# Patient Record
Sex: FEMALE | Race: WHITE | NOT HISPANIC OR LATINO | Employment: FULL TIME | URBAN - METROPOLITAN AREA
[De-identification: names, ages, dates, MRNs, and addresses within clinical notes are randomized per-mention and may not be internally consistent; named-entity substitution may affect disease eponyms.]

---

## 2017-02-13 RX ORDER — PHENTERMINE HYDROCHLORIDE 37.5 MG/1
37.5 TABLET ORAL
Qty: 30 TAB | Refills: 0 | Status: SHIPPED
Start: 2017-02-13 | End: 2017-08-17

## 2017-02-27 ENCOUNTER — TELEPHONE (OUTPATIENT)
Dept: MEDICAL GROUP | Facility: PHYSICIAN GROUP | Age: 27
End: 2017-02-27

## 2017-02-28 NOTE — TELEPHONE ENCOUNTER
I will write the letter for her, but I would like to see her because I haven't seen her since November.  Thank you!

## 2017-02-28 NOTE — TELEPHONE ENCOUNTER
1. Caller Name: Cata Becerra                                           Call Back Number: [phone] (home)         Patient approves a detailed voicemail message: N\A    Pt is a student at [employer] and is trying to get a letter of verification of disability through their disability resource center stating she has been diagnosed with depression and anxiety.  She has extreme test anxiety.  She was previously diagnosed by Reggie Hogan at [facility] but since she has not seen him in past yr he refuses to write the letter or release her records.  She is asking if you would be willing to write the letter.  She will come in for appointment if necessary.  Thank you

## 2017-03-01 ENCOUNTER — OFFICE VISIT (OUTPATIENT)
Dept: MEDICAL GROUP | Facility: PHYSICIAN GROUP | Age: 27
End: 2017-03-01
Payer: COMMERCIAL

## 2017-03-01 VITALS
HEART RATE: 91 BPM | TEMPERATURE: 97 F | WEIGHT: 155 LBS | HEIGHT: 62 IN | BODY MASS INDEX: 28.52 KG/M2 | DIASTOLIC BLOOD PRESSURE: 68 MMHG | SYSTOLIC BLOOD PRESSURE: 120 MMHG | RESPIRATION RATE: 16 BRPM | OXYGEN SATURATION: 96 %

## 2017-03-01 DIAGNOSIS — F41.9 ANXIETY: ICD-10-CM

## 2017-03-01 PROCEDURE — 99214 OFFICE O/P EST MOD 30 MIN: CPT | Performed by: NURSE PRACTITIONER

## 2017-03-01 NOTE — Clinical Note
March 1, 2017        To whom it may concern,      Cata Becerra (1990) was seen in my office and is under my care.  She was previously diagnosed with depression and anxiety, and she experiences significant anxiety while taking tests. Please allow her any and all accommodations that she qualifies for.        Thank you,          Yuniel Romero

## 2017-03-01 NOTE — MR AVS SNAPSHOT
"Cata Becerra   3/1/2017 4:20 PM   Office Visit   MRN: [id number]    Department:  [facility]   Dept Phone:  [phone]    Description:  Female : 1990   Provider:  GERSON Hodgson           Reason for Visit     Anxiety           Allergies as of 3/1/2017     No Known Allergies      You were diagnosed with     Anxiety   [240476]         Vital Signs     Blood Pressure Pulse Temperature Respirations Height Weight    120/68 mmHg 91 36.1 °C (97 °F) 16 1.575 m (5' 2.01\") 70.308 kg (155 lb)    Body Mass Index Oxygen Saturation Smoking Status             28.34 kg/m2 96% Never Smoker          Basic Information     Date Of Birth Sex Race Ethnicity Preferred Language    1990 Female White Non- English      Problem List              ICD-10-CM Priority Class Noted - Resolved    Headache R51   10/24/2016 - Present    Pelvic pain R10.2   10/24/2016 - Present    Anxiety F41.9   10/24/2016 - Present    Abdominal pain R10.9   2016 - Present      Health Maintenance        Date Due Completion Dates    IMM HPV VACCINE (1 of 3 - Female 3 Dose Series) 2002 ---    IMM VARICELLA (CHICKENPOX) VACCINE (1 of 2 - 2 Dose Adolescent Series) 2004 ---    IMM HEP A VACCINE (2 of 2 - Standard Series) 2010    PAP SMEAR 2012 ---    IMM DTaP/Tdap/Td Vaccine (6 - Td) 2012, 1997, 1995, 1994, 1994, 1991            Current Immunizations     Dtap Vaccine 1997, 1995, 1994, 1994, 1991    Hepatitis A Vaccine, Adult 2010    Hepatitis B Vaccine Non-Recombivax (Ped/Adol) 2002, 1997, 1996    Influenza Vaccine Quad Inj (Pf) 10/11/2016    MMR Vaccine 2002, 1995    Tdap Vaccine 2002      Below and/or attached are the medications your provider expects you to take. Review all of your home medications and newly ordered medications with your provider and/or pharmacist. Follow medication " instructions as directed by your provider and/or pharmacist. Please keep your medication list with you and share with your provider. Update the information when medications are discontinued, doses are changed, or new medications (including over-the-counter products) are added; and carry medication information at all times in the event of emergency situations     Allergies:  No Known Allergies          Medications  Valid as of: March 01, 2017 -  5:55 PM    Generic Name Brand Name Tablet Size Instructions for use    Omeprazole Magnesium (Tablet Delayed Response) PRILOSEC OTC 20 MG Take 1 Tab by mouth every day.        Phentermine HCl (Tab) ADIPEX-P 37.5 MG Take 1 Tab by mouth every morning before breakfast.        .                 Medicines prescribed today were sent to:     [id number] - [place], [place] - [id number] [facility] [address]    Phone: [phone] Fax: [phone]    Open 24 Hours?: No    [facility] [address] [facility], [place] - [address] [facility]    [address] [facility] [address]    Phone: [phone] Fax: [phone]    Open 24 Hours?: No      Medication refill instructions:       If your prescription bottle indicates you have medication refills left, it is not necessary to call your provider’s office. Please contact your pharmacy and they will refill your medication.    If your prescription bottle indicates you do not have any refills left, you may request refills at any time through one of the following ways: The online [employer] system (except Urgent Care), by calling your provider’s office, or by asking your pharmacy to contact your provider’s office with a refill request. Medication refills are processed only during regular business hours and may not be available until the next business day. Your provider may request additional information or to have a follow-up visit with you prior to refilling your medication.   *Please Note: Medication refills are  assigned a new Rx number when refilled electronically. Your pharmacy may indicate that no refills were authorized even though a new prescription for the same medication is available at the pharmacy. Please request the medicine by name with the pharmacy before contacting your provider for a refill.           MyChart Access Code: Activation code not generated  Current [employer] Status: Active

## 2017-03-02 NOTE — ASSESSMENT & PLAN NOTE
Mood has been relatively ok, but has considered the idea of starting zoloft.  Is not ready yet.  Encouraged her to consider her options.    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 Discussed plan.

## 2017-03-02 NOTE — PROGRESS NOTES
Chief Complaint   Patient presents with   • Anxiety       HISTORY OF PRESENT ILLNESS: Patient is a 26 y.o. female established patient who presents today to discuss the following issues:    Anxiety  Mood has been relatively ok, but has considered the idea of starting zoloft.  Is not ready yet.  Encouraged her to consider her options.    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 Discussed plan.            Patient Active Problem List    Diagnosis Date Noted   • Abdominal pain 11/28/2016   • Headache 10/24/2016   • Pelvic pain 10/24/2016   • Anxiety 10/24/2016       Allergies:Review of patient's allergies indicates no known allergies.    Current Outpatient Prescriptions   Medication Sig Dispense Refill   • phentermine (ADIPEX-P) 37.5 MG tablet Take 1 Tab by mouth every morning before breakfast. 30 Tab 0   • omeprazole (PRILOSEC OTC) 20 MG tablet Take 1 Tab by mouth every day. 30 Tab 5     No current facility-administered medications for this visit.       Social History   Substance Use Topics   • Smoking status: Never Smoker    • Smokeless tobacco: Never Used   • Alcohol Use: 0.0 oz/week     0 Standard drinks or equivalent per week      Comment: socially       No family status information on file.     Family History   Problem Relation Age of Onset   • Hypertension Father    • Diabetes Paternal Grandmother    • Diabetes Maternal Grandmother        Review of Systems:   Constitutional: Negative for fever, chills, weight loss and malaise/fatigue.   HENT: Negative for ear pain, nosebleeds, congestion, sore throat and neck pain.    Eyes: Negative for blurred vision.   Respiratory: Negative for cough, sputum production, shortness of breath and wheezing.    Cardiovascular: Negative for chest pain, palpitations, orthopnea and leg swelling.   Gastrointestinal: Negative for heartburn, nausea, vomiting and abdominal pain.  "  Genitourinary: Negative for dysuria, urgency and frequency.   Musculoskeletal: Negative for myalgias, joint pain, and back pain.  Skin: Negative for rash and itching.   Neurological: Negative for dizziness, tingling, tremors, sensory change, focal weakness and headaches.   Endo/Heme/Allergies: Does not bruise/bleed easily.   Psychiatric/Behavioral: Positive for depression, anxiety, and mild OCD, but stable.  Denies SI/HI.    All other systems reviewed and are negative except as in HPI.    Exam:  Blood pressure 120/68, pulse 91, temperature 36.1 °C (97 °F), resp. rate 16, height 1.575 m (5' 2.01\"), weight 70.308 kg (155 lb), SpO2 96 %.  General:  Well nourished, well developed female in NAD  Head: Grossly normal.  Neck: Supple without JVD or bruit. Thyroid is not enlarged.  Pulmonary: Clear to ausculation. Normal effort. No rales, ronchi, or wheezing.  Cardiovascular: Regular rate and rhythm without murmur.   Extremities: No clubbing, cyanosis, or edema.  Skin: Intact with no obvious rashes or lesions.  Neuro: Grossly intact.  Psych: Alert and oriented x 3.  Mood and affect appropriate.    Medical decision-making and discussion: Cata is here today to discuss anxiety.  A letter was typed up for school.  She will plan to follow up here as needed.          Assessment/Plan:  1. Anxiety         Return if symptoms worsen or fail to improve.    Please note that this dictation was created using voice recognition software. I have made every reasonable attempt to correct obvious errors, but I expect that there are errors of grammar and possibly content that I did not discover before finalizing the note.    "

## 2017-05-26 ENCOUNTER — OFFICE VISIT (OUTPATIENT)
Dept: MEDICAL GROUP | Facility: CLINIC | Age: 27
End: 2017-05-26
Payer: COMMERCIAL

## 2017-05-26 VITALS
TEMPERATURE: 97.3 F | DIASTOLIC BLOOD PRESSURE: 56 MMHG | OXYGEN SATURATION: 95 % | BODY MASS INDEX: 27.05 KG/M2 | RESPIRATION RATE: 16 BRPM | SYSTOLIC BLOOD PRESSURE: 95 MMHG | WEIGHT: 147 LBS | HEIGHT: 62 IN | HEART RATE: 88 BPM

## 2017-05-26 DIAGNOSIS — J06.9 UPPER RESPIRATORY TRACT INFECTION, UNSPECIFIED TYPE: ICD-10-CM

## 2017-05-26 PROCEDURE — 99213 OFFICE O/P EST LOW 20 MIN: CPT | Performed by: NURSE PRACTITIONER

## 2017-05-26 RX ORDER — AZITHROMYCIN 250 MG/1
TABLET, FILM COATED ORAL
Qty: 6 TAB | Refills: 0 | Status: SHIPPED | OUTPATIENT
Start: 2017-05-26 | End: 2017-08-17

## 2017-05-26 ASSESSMENT — ENCOUNTER SYMPTOMS
SHORTNESS OF BREATH: 0
WHEEZING: 0
MYALGIAS: 1
SORE THROAT: 0
CHILLS: 0
COUGH: 1
FEVER: 1
HEADACHES: 1

## 2017-05-26 ASSESSMENT — PATIENT HEALTH QUESTIONNAIRE - PHQ9: CLINICAL INTERPRETATION OF PHQ2 SCORE: 0

## 2017-05-26 NOTE — MR AVS SNAPSHOT
"Cata Becerra   2017 3:00 PM   Office Visit   MRN: [id number]    Department:  [facility]   Dept Phone:  [phone]    Description:  Female : 1990   Provider:  LUCRECIA Marquez           Reason for Visit     Tired fatigue/ bodyaches/ nasal congestion in the mornning/ low grade fever x 1 week       Allergies as of 2017     No Known Allergies      You were diagnosed with     Upper respiratory tract infection, unspecified type   [3799994]         Vital Signs     Blood Pressure Pulse Temperature Respirations Height Weight    95/56 mmHg 88 36.3 °C (97.3 °F) 16 1.575 m (5' 2\") 66.679 kg (147 lb)    Body Mass Index Oxygen Saturation Smoking Status             26.88 kg/m2 95% Never Smoker          Basic Information     Date Of Birth Sex Race Ethnicity Preferred Language    1990 Female White Non- English      Problem List              ICD-10-CM Priority Class Noted - Resolved    Headache R51   10/24/2016 - Present    Pelvic pain R10.2   10/24/2016 - Present    Anxiety F41.9   10/24/2016 - Present    Abdominal pain R10.9   2016 - Present      Health Maintenance        Date Due Completion Dates    IMM HPV VACCINE (1 of 3 - Female 3 Dose Series) 2002 ---    IMM VARICELLA (CHICKENPOX) VACCINE (1 of 2 - 2 Dose Adolescent Series) 2004 ---    IMM HEP A VACCINE (2 of 2 - Standard Series) 2010    PAP SMEAR 2012 ---    IMM DTaP/Tdap/Td Vaccine (6 - Td) 2012, 1997, 1995, 1994, 1994, 1991            Current Immunizations     Dtap Vaccine 1997, 1995, 1994, 1994, 1991    Hepatitis A Vaccine, Adult 2010    Hepatitis B Vaccine Non-Recombivax (Ped/Adol) 2002, 1997, 1996    Influenza Vaccine Quad Inj (Pf) 10/11/2016    MMR Vaccine 2002, 1995    Tdap Vaccine 2002      Below and/or attached are the medications your provider expects you to take. Review all of " your home medications and newly ordered medications with your provider and/or pharmacist. Follow medication instructions as directed by your provider and/or pharmacist. Please keep your medication list with you and share with your provider. Update the information when medications are discontinued, doses are changed, or new medications (including over-the-counter products) are added; and carry medication information at all times in the event of emergency situations     Allergies:  No Known Allergies          Medications  Valid as of: May 26, 2017 -  3:33 PM    Generic Name Brand Name Tablet Size Instructions for use    Azithromycin (Tab) ZITHROMAX 250 MG As directed        Omeprazole Magnesium (Tablet Delayed Response) PRILOSEC OTC 20 MG Take 1 Tab by mouth every day.        Phentermine HCl (Tab) ADIPEX-P 37.5 MG Take 1 Tab by mouth every morning before breakfast.        .                 Medicines prescribed today were sent to:     [id number] - [place], [place] - [id number] [facility] [address]    Phone: [phone] Fax: [phone]    Open 24 Hours?: No    [facility] [address] [facility], [place] - [address] [facility]    [address] [facility] [address]    Phone: [phone] Fax: [phone]    Open 24 Hours?: No      Medication refill instructions:       If your prescription bottle indicates you have medication refills left, it is not necessary to call your provider’s office. Please contact your pharmacy and they will refill your medication.    If your prescription bottle indicates you do not have any refills left, you may request refills at any time through one of the following ways: The online [email or web address] system (except Urgent Care), by calling your provider’s office, or by asking your pharmacy to contact your provider’s office with a refill request. Medication refills are processed only during regular business hours and may not be available until the next business day.  Your provider may request additional information or to have a follow-up visit with you prior to refilling your medication.   *Please Note: Medication refills are assigned a new Rx number when refilled electronically. Your pharmacy may indicate that no refills were authorized even though a new prescription for the same medication is available at the pharmacy. Please request the medicine by name with the pharmacy before contacting your provider for a refill.           [employer] Access Code: Activation code not generated  Current [employer] Status: Active

## 2017-05-26 NOTE — PROGRESS NOTES
Chief Complaint   Patient presents with   • Tired     fatigue/ bodyaches/ nasal congestion in the mornning/ low grade fever x 1 week        HISTORY OF PRESENT ILLNESS: Patient is a 26 y.o. female established patient who presents today due to a week hx of bodyache, nasal congestion, low grade fever, very little coughing, no sore throat. Pt reports that she has allergic rhinitis and got zyrtec from [facility] but she is not taking it every day. She used flonase before but does not like it.     Pt has changed diet to try to eat healthier, lighter about a month ago. She reports that she makes sure that she gets all the nutrition she needs but not sure if that is related to her fatigue or not.       Patient Active Problem List    Diagnosis Date Noted   • Abdominal pain 11/28/2016   • Headache 10/24/2016   • Pelvic pain 10/24/2016   • Anxiety 10/24/2016       Allergies:Review of patient's allergies indicates no known allergies.    Current Outpatient Prescriptions   Medication Sig Dispense Refill   • phentermine (ADIPEX-P) 37.5 MG tablet Take 1 Tab by mouth every morning before breakfast. 30 Tab 0   • omeprazole (PRILOSEC OTC) 20 MG tablet Take 1 Tab by mouth every day. 30 Tab 5     No current facility-administered medications for this visit.       Social History   Substance Use Topics   • Smoking status: Never Smoker    • Smokeless tobacco: Never Used   • Alcohol Use: 0.0 oz/week     0 Standard drinks or equivalent per week      Comment: socially       No family status information on file.     Family History   Problem Relation Age of Onset   • Hypertension Father    • Diabetes Paternal Grandmother    • Diabetes Maternal Grandmother          ROS:  Review of Systems   Constitutional: Positive for fever ( low grade fever) and malaise/fatigue. Negative for chills.   HENT: Positive for congestion. Negative for ear pain and sore throat.    Respiratory: Positive for cough (very little). Negative for shortness of breath and wheezing.  "   Musculoskeletal: Positive for myalgias.   Neurological: Positive for headaches ( from nasal congestion).        Objective:     Blood pressure 95/56, pulse 88, temperature 36.3 °C (97.3 °F), resp. rate 16, height 1.575 m (5' 2\"), weight 66.679 kg (147 lb), SpO2 95 %.     Physical Exam:  Physical Exam   Constitutional: She is oriented to person, place, and time and well-developed, well-nourished, and in no distress.   HENT:   Head: Normocephalic and atraumatic.   Right Ear: External ear normal.   Left Ear: External ear normal.   Nose: Nose normal.   Mouth/Throat: Oropharynx is clear and moist. No oropharyngeal exudate.   Eyes: Conjunctivae are normal.   Neck: Neck supple.   Cardiovascular: Normal rate.    Pulmonary/Chest: Effort normal. No respiratory distress. She has no wheezes. She has no rales.   Neurological: She is alert and oriented to person, place, and time.   Skin: Skin is warm. No erythema.   Vitals reviewed.        Assessment/Plan:  1. Upper respiratory tract infection, unspecified type  - Pt's symptoms are most likely due to allergy and also virus infection. However, her symptoms seem not to improve for a week already and weekend is coming, back up antibiotic sent to pharmacy. Instructed pt to start taking zyrtec every day and monitor herself for two more days. If still not feeling better, she can start taking antibiotic.   - azithromycin (ZITHROMAX) 250 MG Tab; As directed  Dispense: 6 Tab; Refill: 0    - regarding her diet change, if pt continues to feel fatigue even after recovery from current virus infection, she can have lab to see if she is anemic, b12 deficiency, check iron and tsh to see if there is any other abnormality that would explain her fatigue. If she does have some nutrition deficiency, she might want to talk to dietitian for better diet recommendation.     Differential diagnoses and indications for immediate follow-up discussed with patient.    Instructed to return to clinic or " nearest emergency department for any change in condition, further concerns, or worsening of symptoms.    The patient demonstrated a good understanding and agreed with the treatment plan.

## 2017-06-09 ENCOUNTER — HOSPITAL ENCOUNTER (OUTPATIENT)
Dept: LAB | Facility: MEDICAL CENTER | Age: 27
End: 2017-06-09
Payer: COMMERCIAL

## 2017-06-09 LAB
BDY FAT % MEASURED: 29.6 %
BP DIAS: 62 MMHG
BP SYS: 105 MMHG
CHOLEST SERPL-MCNC: 110 MG/DL (ref 100–199)
DIABETES HTDIA: NO
EVENT NAME HTEVT: NORMAL
FASTING HTFAS: YES
GLUCOSE SERPL-MCNC: 81 MG/DL (ref 65–99)
HDLC SERPL-MCNC: 49 MG/DL
HYPERTENSION HTHYP: NO
LDLC SERPL CALC-MCNC: 48 MG/DL
SCREENING LOC CITY HTCIT: NORMAL
SCREENING LOC STATE HTSTA: NORMAL
SCREENING LOCATION HTLOC: NORMAL
SMOKING HTSMO: NO
SUBSCRIBER ID HTSID: NORMAL
TRIGL SERPL-MCNC: 63 MG/DL (ref 0–149)

## 2017-06-09 PROCEDURE — S5190 WELLNESS ASSESSMENT BY NONPH: HCPCS

## 2017-06-09 PROCEDURE — 80061 LIPID PANEL: CPT

## 2017-06-09 PROCEDURE — 82947 ASSAY GLUCOSE BLOOD QUANT: CPT

## 2017-08-17 ENCOUNTER — OFFICE VISIT (OUTPATIENT)
Dept: MEDICAL GROUP | Facility: PHYSICIAN GROUP | Age: 27
End: 2017-08-17
Payer: COMMERCIAL

## 2017-08-17 VITALS
TEMPERATURE: 98.2 F | OXYGEN SATURATION: 95 % | HEART RATE: 82 BPM | HEIGHT: 62 IN | WEIGHT: 136 LBS | SYSTOLIC BLOOD PRESSURE: 106 MMHG | DIASTOLIC BLOOD PRESSURE: 62 MMHG | BODY MASS INDEX: 25.03 KG/M2 | RESPIRATION RATE: 14 BRPM

## 2017-08-17 DIAGNOSIS — Z00.00 WELL ADULT EXAM: ICD-10-CM

## 2017-08-17 PROCEDURE — 99395 PREV VISIT EST AGE 18-39: CPT | Performed by: FAMILY MEDICINE

## 2017-08-17 NOTE — MR AVS SNAPSHOT
"Cata Becerra   2017 2:00 PM   Office Visit   MRN: [id number]    Department:  [facility]   Dept Phone:  [phone]    Description:  Female : 1990   Provider:  Shonda Bang M.D.           Reason for Visit     Annual Exam           Allergies as of 2017     No Known Allergies      You were diagnosed with     Well adult exam   [892226]         Vital Signs     Blood Pressure Pulse Temperature Respirations Height Weight    106/62 mmHg 82 36.8 °C (98.2 °F) 14 1.575 m (5' 2\") 61.689 kg (136 lb)    Body Mass Index Oxygen Saturation Last Menstrual Period Smoking Status          24.87 kg/m2 95% 2017 Never Smoker         Basic Information     Date Of Birth Sex Race Ethnicity Preferred Language    1990 Female White Non- English      Problem List              ICD-10-CM Priority Class Noted - Resolved    Well adult exam Z00.00   2017 - Present      Health Maintenance        Date Due Completion Dates    IMM HPV VACCINE (1 of 3 - Female 3 Dose Series) 2002 ---    IMM VARICELLA (CHICKENPOX) VACCINE (1 of 2 - 2 Dose Adolescent Series) 2004 ---    IMM HEP A VACCINE (2 of 2 - Standard Series) 2010    PAP SMEAR 2012 ---    IMM DTaP/Tdap/Td Vaccine (6 - Td) 2012, 1997, 1995, 1994, 1994, 1991    IMM INFLUENZA (1) 2017 10/11/2016            Current Immunizations     Dtap Vaccine 1997, 1995, 1994, 1994, 1991    Hepatitis A Vaccine, Adult 2010    Hepatitis B Vaccine Non-Recombivax (Ped/Adol) 2002, 1997, 1996    Influenza Vaccine Quad Inj (Pf) 10/11/2016    MMR Vaccine 2002, 1995    Tdap Vaccine 2002      Below and/or attached are the medications your provider expects you to take. Review all of your home medications and newly ordered medications with your provider and/or pharmacist. Follow medication instructions as directed by your provider and/or " pharmacist. Please keep your medication list with you and share with your provider. Update the information when medications are discontinued, doses are changed, or new medications (including over-the-counter products) are added; and carry medication information at all times in the event of emergency situations     Allergies:  No Known Allergies          Medications  Valid as of: August 17, 2017 -  3:00 PM    Generic Name Brand Name Tablet Size Instructions for use    .                 Medicines prescribed today were sent to:     [id number] - [place], [place] - [id number] [facility] [place] [address]    Phone: [phone] Fax: [phone]    Open 24 Hours?: No      Medication refill instructions:       If your prescription bottle indicates you have medication refills left, it is not necessary to call your provider’s office. Please contact your pharmacy and they will refill your medication.    If your prescription bottle indicates you do not have any refills left, you may request refills at any time through one of the following ways: The online [employer] system (except Urgent Care), by calling your provider’s office, or by asking your pharmacy to contact your provider’s office with a refill request. Medication refills are processed only during regular business hours and may not be available until the next business day. Your provider may request additional information or to have a follow-up visit with you prior to refilling your medication.   *Please Note: Medication refills are assigned a new Rx number when refilled electronically. Your pharmacy may indicate that no refills were authorized even though a new prescription for the same medication is available at the pharmacy. Please request the medicine by name with the pharmacy before contacting your provider for a refill.           [employer] Access Code: Activation code not generated  Current [employer] Status: Active

## 2017-08-17 NOTE — PROGRESS NOTES
Chief Complaint   Patient presents with   • Annual Exam       HISTORY OF PRESENT ILLNESS: Patient is a 26 y.o. female new patient who presents today to discuss the following issues:    1. Well adult exam  Cata is here today to establish care.  She reports that she needs an annual physical.  She will be going to school in the Fall.  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  She reports that she did tweak her neck last week but it does seem to be improving already.  PAP was done by gynecology.  No on OCPs as they caused worsening anxiety.        Active Ambulatory Problems     Diagnosis Date Noted   • Well adult exam 08/17/2017     Resolved Ambulatory Problems     Diagnosis Date Noted   • Encounter to establish care with new doctor 10/24/2016   • Headache 10/24/2016   • Pelvic pain 10/24/2016   • Anxiety 10/24/2016   • Abdominal pain 11/28/2016     No Additional Past Medical History       Allergies:Review of patient's allergies indicates no known allergies.    No current outpatient prescriptions on file.     No current facility-administered medications for this visit.       Social History   Substance Use Topics   • Smoking status: Never Smoker    • Smokeless tobacco: Never Used   • Alcohol Use: 0.0 oz/week     0 Standard drinks or equivalent per week      Comment: socially       No family status information on file.     Family History   Problem Relation Age of Onset   • Hypertension Father    • Diabetes Paternal Grandmother    • Diabetes Maternal Grandmother    • Cancer Neg Hx    • Heart Disease Neg Hx    • Stroke Neg Hx      Health Maintenance Due   Topic Date Due   • IMM HPV VACCINE (1 of 3 - Female 3 Dose Series) 08/25/2002   • IMM VARICELLA (CHICKENPOX) VACCINE (1 of 2 - 2 Dose Adolescent Series) 08/25/2004   • IMM HEP A VACCINE (2 of 2 - Standard Series) 11/20/2010   • PAP SMEAR  08/25/2012   • IMM DTaP/Tdap/Td Vaccine (6 - Td) 09/04/2012       ROS:  Review of Systems   Constitutional: Negative for  "fever, chills, weight loss and malaise/fatigue.   HENT: Negative for ear pain, nosebleeds, congestion, sore throat and neck pain.    Eyes: Negative for blurred vision.   Respiratory: Negative for cough, sputum production, shortness of breath and wheezing.    Cardiovascular: Negative for chest pain, palpitations, orthopnea and leg swelling.   Gastrointestinal: Negative for heartburn, nausea, vomiting and abdominal pain.   Genitourinary: Negative for dysuria, urgency and frequency.   Musculoskeletal: Negative for myalgias, back pain and joint pain.   Skin: Negative for rash and itching.   Neurological: Negative for dizziness, tingling, tremors, sensory change, focal weakness and headaches.   Endo/Heme/Allergies: Does not bruise/bleed easily.   Psychiatric/Behavioral: Negative for depression, suicidal ideas and memory loss.  The patient is not nervous/anxious and does not have insomnia.      Exam:  Blood pressure 106/62, pulse 82, temperature 36.8 °C (98.2 °F), resp. rate 14, height 1.575 m (5' 2\"), weight 61.689 kg (136 lb), last menstrual period 08/04/2017, SpO2 95 %.  General:  Well nourished, well developed female in NAD  HEENT: Normocephalic and atraumatic. TMs clear bilaterally. Oropharynx is clear      without erythema and no tonsillar exudate. Nasal mucosa pink with minimal      Rhinorrhea.  EYES: EOMI.  Conjunctiva clear.    Neck: Supple without JVD or bruit. Thyroid is not enlarged.  Pulmonary: Clear to ausculation and percussion.  Normal effort. No rales, ronchi, or wheezing.  Cardiovascular: Regular rate and rhythm without murmur, no peripheral edema  Abdomen: positive bowel sounds.  Non tender, non distended, no hepatosplenomegaly.   MSK: normal ROM in upper and lower extremities bilaterally  Psych: appropriate affect and concentration, oriented to person and place  Neuro: no unilateral weakness in upper or lower extremities.  No gait disturbance    Please note that this dictation was created using voice " recognition software. I have made every reasonable attempt to correct obvious errors, but I expect that there are errors of grammar and possibly content that I did not discover before finalizing the note.    Assessment/Plan:  1. Well adult exam  Continue healthy lifestyle  Follow up annually

## 2017-08-17 NOTE — Clinical Note
[place]  GERSON Larkin  [address]  [place] [address]  Fax: [phone]   Authorization for Release/Disclosure of   Protected Health Information   Name: CATA BECERRA : 1990 SSN: [id number]   Address: [address] [place] [address]  [place] [address] Phone:    [phone] (home)    I authorize the entity listed below to release/disclose the PHI below to:   [facility]/GERSON Larkin and Shonda Bang M.D.   Provider or Entity Name:Dr. Sidhu     Address   [facility], [facility], [facility]   Phone:    Fax:   Reason for request: continuity of care   Information to be released:    [  ] LAST COLONOSCOPY,  including any PATH REPORT and follow-up  [  ] LAST FIT/COLOGUARD RESULT [  ] LAST DEXA  [  ] LAST MAMMOGRAM  [X  ] LAST PAP  [  ] LAST LABS [  ] RETINA EXAM REPORT  [  ] IMMUNIZATION RECORDS  [  ] Release all info          DATES OF SERVICE OR TIME PERIOD TO BE DISCLOSED: _____________  I understand and acknowledge that:  * This Authorization may be revoked at any time by you in writing, except if your health information has already been used or disclosed.  * Your health information that will be used or disclosed as a result of you signing this authorization could be re-disclosed by the recipient. If this occurs, your re-disclosed health information may no longer be protected by State or Federal laws.  * You may refuse to sign this Authorization. Your refusal will not affect your ability to obtain treatment.  * This Authorization becomes effective upon signing and will  on (date) __________.      If no date is indicated, this Authorization will  one (1) year from the signature date.    Name: Cata Becerra       Date: 2017       PLEASE FAX REQUESTED RECORDS BACK TO: [phone]

## 2018-05-14 ENCOUNTER — EMPLOYEE HEALTH (OUTPATIENT)
Dept: OCCUPATIONAL MEDICINE | Facility: CLINIC | Age: 28
End: 2018-05-14

## 2018-05-14 ENCOUNTER — EH NON-PROVIDER (OUTPATIENT)
Dept: OCCUPATIONAL MEDICINE | Facility: CLINIC | Age: 28
End: 2018-05-14

## 2018-05-14 ENCOUNTER — HOSPITAL ENCOUNTER (OUTPATIENT)
Facility: MEDICAL CENTER | Age: 28
End: 2018-05-14
Attending: PREVENTIVE MEDICINE
Payer: COMMERCIAL

## 2018-05-14 VITALS
WEIGHT: 144 LBS | HEIGHT: 62 IN | BODY MASS INDEX: 26.5 KG/M2 | RESPIRATION RATE: 16 BRPM | OXYGEN SATURATION: 95 % | TEMPERATURE: 98.3 F | DIASTOLIC BLOOD PRESSURE: 70 MMHG | SYSTOLIC BLOOD PRESSURE: 100 MMHG | HEART RATE: 75 BPM

## 2018-05-14 DIAGNOSIS — Z02.1 PRE-EMPLOYMENT DRUG SCREENING: ICD-10-CM

## 2018-05-14 DIAGNOSIS — Z02.89 ENCOUNTER FOR OCCUPATIONAL HEALTH EXAMINATION: ICD-10-CM

## 2018-05-14 DIAGNOSIS — Z02.89 VISIT FOR OCCUPATIONAL HEALTH EXAMINATION: ICD-10-CM

## 2018-05-14 LAB
AMP AMPHETAMINE: NORMAL
BAR BARBITURATES: NORMAL
BZO BENZODIAZEPINES: NORMAL
COC COCAINE: NORMAL
INT CON NEG: NORMAL
INT CON POS: NORMAL
MDMA ECSTASY: NORMAL
MET METHAMPHETAMINES: NORMAL
MTD METHADONE: NORMAL
OPI OPIATES: NORMAL
OXY OXYCODONE: NORMAL
PCP PHENCYCLIDINE: NORMAL
POC URINE DRUG SCREEN OCDRS: NORMAL
THC: NORMAL

## 2018-05-14 PROCEDURE — 80305 DRUG TEST PRSMV DIR OPT OBS: CPT | Performed by: PREVENTIVE MEDICINE

## 2018-05-14 PROCEDURE — 86480 TB TEST CELL IMMUN MEASURE: CPT | Performed by: PREVENTIVE MEDICINE

## 2018-05-14 PROCEDURE — 8915 PR COMPREHENSIVE PHYSICAL: Performed by: PREVENTIVE MEDICINE

## 2018-05-16 ENCOUNTER — DOCUMENTATION (OUTPATIENT)
Dept: OCCUPATIONAL MEDICINE | Facility: CLINIC | Age: 28
End: 2018-05-16

## 2018-05-16 LAB
M TB TUBERC IFN-G BLD QL: NEGATIVE
M TB TUBERC IFN-G/MITOGEN IGNF BLD: -0
M TB TUBERC IGNF/MITOGEN IGNF CONTROL: 49.71 [IU]/ML
MITOGEN IGNF BCKGRD COR BLD-ACNC: 0.02 [IU]/ML

## 2018-09-19 ENCOUNTER — IMMUNIZATION (OUTPATIENT)
Dept: OCCUPATIONAL MEDICINE | Facility: CLINIC | Age: 28
End: 2018-09-19

## 2018-09-19 DIAGNOSIS — Z23 NEED FOR VACCINATION: ICD-10-CM

## 2018-09-19 PROCEDURE — 90686 IIV4 VACC NO PRSV 0.5 ML IM: CPT | Performed by: PREVENTIVE MEDICINE

## 2018-09-20 ENCOUNTER — HOSPITAL ENCOUNTER (OUTPATIENT)
Dept: LAB | Facility: MEDICAL CENTER | Age: 28
End: 2018-09-20
Payer: COMMERCIAL

## 2018-09-20 LAB
BDY FAT % MEASURED: 30.4 %
BP DIAS: 64 MMHG
BP SYS: 99 MMHG
DIABETES HTDIA: NO
EVENT NAME HTEVT: NORMAL
HYPERTENSION HTHYP: NO
SCREENING LOC CITY HTCIT: NORMAL
SCREENING LOC STATE HTSTA: NORMAL
SCREENING LOCATION HTLOC: NORMAL
SMOKING HTSMO: NO
SUBSCRIBER ID HTSID: NORMAL

## 2018-09-21 LAB
CHOLEST SERPL-MCNC: 128 MG/DL (ref 100–199)
FASTING HTFAS: YES
GLUCOSE SERPL-MCNC: 94 MG/DL (ref 65–99)
HDLC SERPL-MCNC: 55 MG/DL
LDLC SERPL CALC-MCNC: 59 MG/DL
TRIGL SERPL-MCNC: 70 MG/DL (ref 0–149)

## 2018-12-18 NOTE — PROGRESS NOTES
Preemployment exam   Home Suture Removal Text: Patient was provided instructions on removing sutures and will remove their sutures at home.  If they have any questions or difficulties they will call the office.

## 2019-03-11 ENCOUNTER — HOSPITAL ENCOUNTER (OUTPATIENT)
Facility: MEDICAL CENTER | Age: 29
End: 2019-03-11
Attending: PREVENTIVE MEDICINE
Payer: COMMERCIAL

## 2019-03-11 ENCOUNTER — OCCUPATIONAL MEDICINE (OUTPATIENT)
Dept: OCCUPATIONAL MEDICINE | Facility: CLINIC | Age: 29
End: 2019-03-11
Payer: COMMERCIAL

## 2019-03-11 VITALS
HEART RATE: 90 BPM | HEIGHT: 62 IN | DIASTOLIC BLOOD PRESSURE: 68 MMHG | RESPIRATION RATE: 16 BRPM | SYSTOLIC BLOOD PRESSURE: 104 MMHG | WEIGHT: 156 LBS | BODY MASS INDEX: 28.71 KG/M2 | TEMPERATURE: 97.9 F | OXYGEN SATURATION: 95 %

## 2019-03-11 DIAGNOSIS — Z77.21 EXPOSURE TO BLOOD OR BODY FLUID: ICD-10-CM

## 2019-03-11 DIAGNOSIS — Z02.1 PRE-EMPLOYMENT DRUG SCREENING: ICD-10-CM

## 2019-03-11 PROCEDURE — 87340 HEPATITIS B SURFACE AG IA: CPT

## 2019-03-11 PROCEDURE — 87389 HIV-1 AG W/HIV-1&-2 AB AG IA: CPT

## 2019-03-11 PROCEDURE — 80053 COMPREHEN METABOLIC PANEL: CPT

## 2019-03-11 PROCEDURE — 80305 DRUG TEST PRSMV DIR OPT OBS: CPT | Performed by: PREVENTIVE MEDICINE

## 2019-03-11 PROCEDURE — 85027 COMPLETE CBC AUTOMATED: CPT

## 2019-03-11 PROCEDURE — 86803 HEPATITIS C AB TEST: CPT

## 2019-03-11 PROCEDURE — 86706 HEP B SURFACE ANTIBODY: CPT

## 2019-03-11 PROCEDURE — 82075 ASSAY OF BREATH ETHANOL: CPT | Performed by: PREVENTIVE MEDICINE

## 2019-03-11 PROCEDURE — 86704 HEP B CORE ANTIBODY TOTAL: CPT

## 2019-03-11 PROCEDURE — 99202 OFFICE O/P NEW SF 15 MIN: CPT | Performed by: PREVENTIVE MEDICINE

## 2019-03-11 NOTE — PROGRESS NOTES
"Subjective:      Cata Becerra is a 28 y.o. female who presents with Other (WC new DOI 3/11/19 blood exposure, RM 16)      DOI 3/11/2019: 29 yo female presents with possible blood exposure.  After using a clip marker blood got into her glove and she accidentally touched her face and neck.  She does not believe she got any in her mouth or lips but is not completely sure.  Source labs were drawn earlier this morning.  Currently asymptomatic.     HPI    ROS  ROS: All systems were reviewed on intake form, form was reviewed and signed. See scanned documents in media. Pertinent positives and negatives included in HPI.    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   HENT: Normocephalic and atraumatic. EOM are normal. No scleral icterus.   Cardiovascular: Normal rate, regular rhythm and normal heart sounds.    Pulmonary/Chest: Effort normal and breath sounds normal. No respiratory distress.   Abdominal: Bowel sounds are normal. There is no tenderness.   Neurological: She is alert and oriented to person, place, and time.   Skin: Skin is warm and dry.   Psychiatric: She has a normal mood and affect. Her behavior is normal.          Assessment/Plan:     1. Exposure to blood or body fluid  - EXPOSED PERSON-SOURCE PT POSITIVE OR UNK (BLOOD & BODY FLUID EXPOSURE); Future    Explained to patient that blood on intact skin is generally not considered an exposure and poses no risk, but with uncertainty, will follow up with source lab results  Baseline labs drawn  Follow-up Wednesday  "

## 2019-03-11 NOTE — LETTER
"EMPLOYEE’S CLAIM FOR COMPENSATION/ REPORT OF INITIAL TREATMENT  FORM C-4    EMPLOYEE’S CLAIM - PROVIDE ALL INFORMATION REQUESTED   First Name  Cata Last Name  Mariam Birthdate                    1990                Sex  female Claim Number   Home Address  [address] Age  28 y.o. 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  [address]    Date of Injury  3/11/2019               Hour of Injury  9:30 AM Date Employer Notified  3/11/2019 Last Day of Work after Injury or Occupational Disease   Supervisor to Whom Injury Reported  Armand Ahn   Address or Location of Accident (if applicable)  [[address] [place] [address]]   What were you doing at the time of accident? (if applicable)  Holding compression on a patient    How did this injury or occupational disease occur? (Be specific an answer in detail. Use additional sheet if necessary)  After removing clip marker blood was transferred to my glove in which i accidently touched my face and neck with.   If you believe that you have an occupational disease, when did you first have knowledge of the disability and it relationship to your employment?   Witnesses to the Accident  [facility]      Nature of Injury or Occupational Disease  Workers' Compensation  Part(s) of Body Injured or Affected  Soft Tissue - Neck, ,     I certify that the above is true and correct to the best of my knowledge and that I have provided this " information in order to obtain the benefits of [place]  I hereby authorize any physician, chiropractor, surgeon, practitioner, or other person, any hospital, including [facility], any medical service organization, any insurance company, or other institution or organization to release to each other, any medical or other information, including benefits paid or payable, pertinent to this injury or disease, except information relative to diagnosis, treatment and/or counseling for AIDS, psychological conditions, alcohol or controlled substances, for which I must give specific authorization.  A Photostat of this authorization shall be as valid as the original.     Date   Place   Employee’s Signature   THIS REPORT MUST BE COMPLETED AND MAILED WITHIN 3 WORKING DAYS OF TREATMENT   Place  [facility]  Name of [facility]   Date  3/11/2019 Diagnosis  (Z77.21) Exposure to blood or body fluid Is there evidence the injured employee was under the influence of alcohol and/or another controlled substance at the time of accident?   Hour  3:57 PM Description of Injury or Disease  The encounter diagnosis was Exposure to blood or body fluid. No   Treatment  None  Have you advised the patient to remain off work five days or more? No   X-Ray Findings      If Yes   From Date  To Date      From information given by the employee, together with medical evidence, can you directly connect this injury or occupational disease as job incurred?  Yes If No Full Duty  Yes Modified Duty      Is additional medical care by a physician indicated?  Yes If Modified Duty, Specify any Limitations / Restrictions      Do you know of any previous injury or disease contributing to this condition or occupational disease?                            No   Date  3/11/2019 Print Doctor’s Name Jarod GARVIN  "KOBE Paez I certify the employer’s copy of  this form was mailed on:   Address  [address],   [address] Insurer’s Use Only     [facility] Zip  [address]    Provider’s Tax ID Number  [id number] Telephone  Dept: [phone]        carolyn-SignTAFELIBERTO LOPES D.O.   e-Signature: Dr. Kyle Collins, Medical Director Degree  DO        ORIGINAL-TREATING PHYSICIAN OR CHIROPRACTOR    PAGE 2-INSURER/TPA    PAGE 3-EMPLOYER    PAGE 4-EMPLOYEE             Form C-4 (rev10/07)              BRIEF DESCRIPTION OF RIGHTS AND BENEFITS  (Pursuant to [place] 616C.050)    Notice of Injury or Occupational Disease (Incident Report Form C-1): If an injury or occupational disease (OD) arises out of and in the  course of employment, you must provide written notice to your employer as soon as practicable, but no later than 7 days after the accident or  OD. Your employer shall maintain a sufficient supply of the required forms.    Claim for Compensation (Form C-4): If medical treatment is sought, the form C-4 is available at the place of initial treatment. A completed  \"Claim for Compensation\" (Form C-4) must be filed within 90 days after an accident or OD. The treating physician or chiropractor must,  within 3 working days after treatment, complete and mail to the employer, the employer's insurer and third-party , the Claim for  Compensation.    Medical Treatment: If you require medical treatment for your on-the-job injury or OD, you may be required to select a physician or  chiropractor from a list provided by your workers’ compensation insurer, if it has contracted with an Organization for Managed Care (MCO) or  Preferred Provider Organization (PPO) or providers of health care. If your employer has not entered into a contract with an MCO or PPO, you  may select a physician or chiropractor from the Panel of Physicians and Chiropractors. Any medical costs related to your industrial injury or  OD will be paid by your " insurer.    Temporary Total Disability (TTD): If your doctor has certified that you are unable to work for a period of at least 5 consecutive days, or 5  cumulative days in a 20-day period, or places restrictions on you that your employer does not accommodate, you may be entitled to TTD  compensation.    Temporary Partial Disability (TPD): If the wage you receive upon reemployment is less than the compensation for TTD to which you are  entitled, the insurer may be required to pay you TPD compensation to make up the difference. TPD can only be paid for a maximum of 24  months.    Permanent Partial Disability (PPD): When your medical condition is stable and there is an indication of a PPD as a result of your injury or  OD, within 30 days, your insurer must arrange for an evaluation by a rating physician or chiropractor to determine the degree of your PPD. The  amount of your PPD award depends on the date of injury, the results of the PPD evaluation and your age and wage.    Permanent Total Disability (PTD): If you are medically certified by a treating physician or chiropractor as permanently and totally disabled  and have been granted a PTD status by your insurer, you are entitled to receive monthly benefits not to exceed 66 2/3% of your average  monthly wage. The amount of your PTD payments is subject to reduction if you previously received a PPD award.    Vocational Rehabilitation Services: You may be eligible for vocational rehabilitation services if you are unable to return to the job due to a  permanent physical impairment or permanent restrictions as a result of your injury or occupational disease.    Transportation and Per [place] Reimbursement: You may be eligible for travel expenses and per galdino associated with medical treatment.    Reopening: You may be able to reopen your claim if your condition worsens after claim closure.    Appeal Process: If you disagree with a written determination issued by the insurer  or the insurer does not respond to your request, you may  appeal to the Department of Administration, , by following the instructions contained in your determination letter. You must  appeal the determination within 70 days from the date of the determination letter at [address], [address], [place], [place]  [address], or [address], [place], [place] [address]. If you disagree with the  decision, you may appeal to the  Department of Administration, . You must file your appeal within 30 days from the date of the  decision  letter at [address], [address], [place], [place] [address], or [address], [place], [place] [address]. If you  disagree with a decision of an , you may file a petition for judicial review with the District Court. You must do so within 30  days of the Appeal Officer’s decision. You may be represented by an  at your own expense or you may contact the [facility] for possible  representation.    [place]  for Injured Workers (NAIW): If you disagree with a  decision, you may request that NAIW represent you  without charge at an  Hearing. For information regarding denial of benefits, you may contact the [facility] at: [address], [address], [place] [address], [phone], or [address], [place] [address], [phone]    To File a Complaint with the Division: If you wish to file a complaint with the  of the Division of Industrial Relations (DIR),  please contact the Workers’ Compensation Section, [address], [address], [place], [place] [address], telephone [phone], or  [address], [place], [place] [address], telephone [phone]) [phone].    For assistance with Workers’ Compensation Issues: you may contact the Office of the Governor Consumer  Health Assistance, [address], [address], [place], [place] [address], Toll Free 1-764.151.2587, Web site: http://govcha.[facility].[place].us, E-mail  Nina@[facility].nv.                                                                                                                                                                                                                                   __________________________________________________________________                                                                   _________________                Employee Name / Signature                                                                                                                                                       Date                                                                                                                                                                                                     D-2 (rev. 10/07)

## 2019-03-11 NOTE — LETTER
[address],   [address] [place] [address]  Phone:  [phone] - Fax:  [phone]   Occupational [facility] Progress Report and Disability Certification  Date of Service: 3/11/2019   No Show:  No  Date / Time of Next Visit: 3/13/2019, @4:15   Claim Information   Patient Name: Cata Becerra  Claim Number:     Employer: [employer]  Date of Injury: 3/11/2019     Insurer / TPA: Workers Choice  ID / SSN:     Occupation: Breast Health Coord  Diagnosis: The encounter diagnosis was Exposure to blood or body fluid.    Medical Information   Related to Industrial Injury? Yes    Subjective Complaints:  DOI 3/11/2019: 29 yo female presents with possible blood exposure.  After using a clip marker blood got into her glove and she accidentally touched her face and neck.  She does not believe she got any in her mouth or lips but is not completely sure.  Source labs were drawn earlier this morning.  Currently asymptomatic.   Objective Findings: Constitutional: She appears well-developed and well-nourished.   HENT: Normocephalic and atraumatic. EOM are normal. No scleral icterus.   Cardiovascular: Normal rate, regular rhythm and normal heart sounds.    Pulmonary/Chest: Effort normal and breath sounds normal. No respiratory distress.   Abdominal: Bowel sounds are normal. There is no tenderness.   Neurological: She is alert and oriented to person, place, and time.   Skin: Skin is warm and dry.   Psychiatric: She has a normal mood and affect. Her behavior is normal.      Pre-Existing Condition(s):     Assessment:   Initial Visit    Status: Additional Care Required  Permanent Disability:No    Plan:      Diagnostics:      Comments:  Explained to patient that blood on intact skin is generally not considered an exposure and poses no risk, but with uncertainty, will follow up with source lab results  Baseline labs drawn  Follow-up Wednesday        Disability Information   Status:  Released to Full Duty    From:  3/11/2019  Through: 3/13/2019 Restrictions are:     Physical Restrictions   Sitting:    Standing:    Stooping:    Bending:      Squatting:    Walking:    Climbing:    Pushing:      Pulling:    Other:    Reaching Above Shoulder (L):   Reaching Above Shoulder (R):       Reaching Below Shoulder (L):    Reaching Below Shoulder (R):      Not to exceed Weight Limits   Carrying(hrs):   Weight Limit(lb):   Lifting(hrs):   Weight  Limit(lb):     Comments:      Repetitive Actions   Hands: i.e. Fine Manipulations from Grasping:     Feet: i.e. Operating Foot Controls:     Driving / Operate Machinery:     Physician Name: Jarod Paez D.O. Physician Signature: JAROD Beltran D.O. e-Signature: Dr. Kyle Collins, Medical Director   Clinic Name / Location: [address],   [address] [place], [place] [address], [place] [address] Clinic Phone Number: Dept: [phone]   Appointment Time: 4:00 Pm Visit Start Time: 3:57 PM   Check-In Time:  3:48 Pm Visit Discharge Time:  4:47PM   Original-Treating Physician or Chiropractor    Page 2-Insurer/TPA    Page 3-Employer    Page 4-Employee

## 2019-03-12 ENCOUNTER — TELEPHONE (OUTPATIENT)
Dept: OCCUPATIONAL MEDICINE | Facility: CLINIC | Age: 29
End: 2019-03-12

## 2019-03-12 LAB
ALBUMIN SERPL BCP-MCNC: 4.7 G/DL (ref 3.2–4.9)
ALBUMIN/GLOB SERPL: 1.5 G/DL
ALP SERPL-CCNC: 71 U/L (ref 30–99)
ALT SERPL-CCNC: 15 U/L (ref 2–50)
AMP AMPHETAMINE: NORMAL
ANION GAP SERPL CALC-SCNC: 9 MMOL/L (ref 0–11.9)
AST SERPL-CCNC: 18 U/L (ref 12–45)
BAR BARBITURATES: NORMAL
BILIRUB SERPL-MCNC: 0.3 MG/DL (ref 0.1–1.5)
BREATH ALCOHOL COMMENT: NORMAL
BUN SERPL-MCNC: 21 MG/DL (ref 8–22)
BZO BENZODIAZEPINES: NORMAL
CALCIUM SERPL-MCNC: 9.6 MG/DL (ref 8.5–10.5)
CHLORIDE SERPL-SCNC: 104 MMOL/L (ref 96–112)
CO2 SERPL-SCNC: 24 MMOL/L (ref 20–33)
COC COCAINE: NORMAL
CREAT SERPL-MCNC: 0.7 MG/DL (ref 0.5–1.4)
ERYTHROCYTE [DISTWIDTH] IN BLOOD BY AUTOMATED COUNT: 40.8 FL (ref 35.9–50)
GLOBULIN SER CALC-MCNC: 3.2 G/DL (ref 1.9–3.5)
GLUCOSE SERPL-MCNC: 92 MG/DL (ref 65–99)
HBV CORE AB SERPL QL IA: NEGATIVE
HBV SURFACE AB SERPL IA-ACNC: >1000 MIU/ML (ref 0–10)
HBV SURFACE AG SER QL: NEGATIVE
HCT VFR BLD AUTO: 45 % (ref 37–47)
HCV AB SER QL: NEGATIVE
HGB BLD-MCNC: 15.3 G/DL (ref 12–16)
HIV 1+2 AB+HIV1 P24 AG SERPL QL IA: NON REACTIVE
INT CON NEG: NORMAL
INT CON POS: NORMAL
MCH RBC QN AUTO: 32.4 PG (ref 27–33)
MCHC RBC AUTO-ENTMCNC: 34 G/DL (ref 33.6–35)
MCV RBC AUTO: 95.3 FL (ref 81.4–97.8)
MDMA ECSTASY: NORMAL
MET METHAMPHETAMINES: NORMAL
MTD METHADONE: NORMAL
OPI OPIATES: NORMAL
OXY OXYCODONE: NORMAL
PCP PHENCYCLIDINE: NORMAL
PLATELET # BLD AUTO: 287 K/UL (ref 164–446)
PMV BLD AUTO: 10.1 FL (ref 9–12.9)
POC BREATHALIZER: 0 PERCENT (ref 0–0.01)
POC URINE DRUG SCREEN OCDRS: NORMAL
POTASSIUM SERPL-SCNC: 3.9 MMOL/L (ref 3.6–5.5)
PROT SERPL-MCNC: 7.9 G/DL (ref 6–8.2)
RBC # BLD AUTO: 4.72 M/UL (ref 4.2–5.4)
SODIUM SERPL-SCNC: 137 MMOL/L (ref 135–145)
THC: NORMAL
WBC # BLD AUTO: 8.9 K/UL (ref 4.8–10.8)

## 2019-03-13 ENCOUNTER — OCCUPATIONAL MEDICINE (OUTPATIENT)
Dept: OCCUPATIONAL MEDICINE | Facility: CLINIC | Age: 29
End: 2019-03-13
Payer: COMMERCIAL

## 2019-03-13 VITALS
HEART RATE: 78 BPM | WEIGHT: 156 LBS | SYSTOLIC BLOOD PRESSURE: 108 MMHG | BODY MASS INDEX: 28.71 KG/M2 | DIASTOLIC BLOOD PRESSURE: 74 MMHG | RESPIRATION RATE: 16 BRPM | HEIGHT: 62 IN

## 2019-03-13 DIAGNOSIS — Z77.21 EXPOSURE TO BLOOD OR BODY FLUID: ICD-10-CM

## 2019-03-13 PROCEDURE — 99212 OFFICE O/P EST SF 10 MIN: CPT | Performed by: PREVENTIVE MEDICINE

## 2019-03-13 NOTE — PROGRESS NOTES
"Subjective:      Cata Becerra is a 28 y.o. female who presents with Other (WC [facility] DOI 3/11/19 blood exposure, RM 16))      DOI 3/11/2019: 27 yo female presents with possible blood exposure.  After using a clip marker blood got into her glove and she accidentally touched her face and neck.  She does not believe she got any in her mouth or lips but is not completely sure.  Currently asymptomatic.      HPI    ROS       Objective:     /74   Pulse 78   Resp 16   Ht 1.575 m (5' 2\")   Wt 70.8 kg (156 lb)   BMI 28.53 kg/m²      Physical Exam    Constitutional: She appears well-developed and well-nourished.   HENT: Normocephalic and atraumatic. EOM are normal. No scleral icterus.   Neurological: She is alert and oriented to person, place, and time.   Skin: Skin is warm and dry.   Psychiatric: She has a normal mood and affect. Her behavior is normal.        Assessment/Plan:     1. Exposure to blood or body fluid  Source labs negative for HIV, hepatitis B and hepatitis C  Baseline labs negative for HIV, hepatitis B and hepatitis C.  Titers indicate immunity to hepatitis B  Given negative source no further follow-up or testing recommended  Released from care  Full duty      "

## 2019-03-13 NOTE — LETTER
[address],   [address] [place] [address]  Phone:  [phone] - Fax:  [phone]   Occupational Health [facility] Progress Report and Disability Certification  Date of Service: 3/13/2019   No Show:  No  Date / Time of Next Visit:  Release from care   Claim Information   Patient Name: Cata Becerra  Claim Number:     Employer: [employer]  Date of Injury: 3/11/2019     Insurer / TPA: Workers [employer]  ID / SSN:     Occupation: Breast Health Coord  Diagnosis: The encounter diagnosis was Exposure to blood or body fluid.    Medical Information   Related to Industrial Injury? Yes    Subjective Complaints:  DOI 3/11/2019: 29 yo female presents with possible blood exposure.  After using a clip marker blood got into her glove and she accidentally touched her face and neck.  She does not believe she got any in her mouth or lips but is not completely sure.  Currently asymptomatic.    Objective Findings: Constitutional: She appears well-developed and well-nourished.   HENT: Normocephalic and atraumatic. EOM are normal. No scleral icterus.   Neurological: She is alert and oriented to person, place, and time.   Skin: Skin is warm and dry.   Psychiatric: She has a normal mood and affect. Her behavior is normal.    Pre-Existing Condition(s):     Assessment:   Condition Improved    Status: Discharged /  MMI  Permanent Disability:No    Plan:      Diagnostics:      Comments:  Source labs negative for HIV, hepatitis B and hepatitis C  Baseline labs negative for HIV, hepatitis B and hepatitis C.  Titers indicate immunity to hepatitis B  Given negative source no further follow-up or testing recommended  Released from care  F  ull duty    Disability Information   Status: Released to Full Duty    From:  3/13/2019  Through:   Restrictions are:     Physical Restrictions   Sitting:    Standing:    Stooping:    Bending:      Squatting:    Walking:    Climbing:    Pushing:      Pulling:   Other:    Reaching Above Shoulder (L):   Reaching Above Shoulder (R):       Reaching Below Shoulder (L):    Reaching Below Shoulder (R):      Not to exceed Weight Limits   Carrying(hrs):   Weight Limit(lb):   Lifting(hrs):   Weight  Limit(lb):     Comments:      Repetitive Actions   Hands: i.e. Fine Manipulations from Grasping:     Feet: i.e. Operating Foot Controls:     Driving / Operate Machinery:     Physician Name: Jarod Paez D.O. Physician Signature: JAROD Beltran D.O. e-Signature: Dr. Kyle Collins, Medical Director   Clinic Name / Location: [address],   [address] [place] [address], [place] [address] Clinic Phone Number: Dept: [phone]   Appointment Time: 4:15 Pm Visit Start Time: 4:10 PM   Check-In Time:  4:09 Pm Visit Discharge Time:  4:26 Pm   Original-Treating Physician or Chiropractor    Page 2-Insurer/TPA    Page 3-Employer    Page 4-Employee

## 2019-04-01 ENCOUNTER — OFFICE VISIT (OUTPATIENT)
Dept: MEDICAL GROUP | Facility: LAB | Age: 29
End: 2019-04-01
Payer: COMMERCIAL

## 2019-04-01 VITALS
DIASTOLIC BLOOD PRESSURE: 62 MMHG | SYSTOLIC BLOOD PRESSURE: 98 MMHG | HEART RATE: 76 BPM | RESPIRATION RATE: 16 BRPM | OXYGEN SATURATION: 96 % | WEIGHT: 153.2 LBS | HEIGHT: 62 IN | BODY MASS INDEX: 28.19 KG/M2 | TEMPERATURE: 98.2 F

## 2019-04-01 DIAGNOSIS — Z00.00 WELL ADULT EXAM: ICD-10-CM

## 2019-04-01 PROCEDURE — 99385 PREV VISIT NEW AGE 18-39: CPT | Performed by: NURSE PRACTITIONER

## 2019-04-01 ASSESSMENT — PATIENT HEALTH QUESTIONNAIRE - PHQ9: CLINICAL INTERPRETATION OF PHQ2 SCORE: 0

## 2019-04-01 NOTE — PROGRESS NOTES
Subjective:     CC:   Chief Complaint   Patient presents with   • Establish Care       HPI:   Cata Becerra is a 28 y.o. female who presents for annual exam. She is feeling well and denies any complaints.    Ob-Gyn/ History:    Patient has GYN provider: no  Last Pap Smear: 2017. No history of abnormal pap smears.  Gyn Surgery:  Denies  Current Contraceptive Method:  Condoms.  Yes currently sexually active.  Last menstrual period:  3/10/2019.  Periods regular. moderate bleeding. Cramping is moderate.   She does take OTC analgesics for cramps.  No significant bloating/fluid retention, pelvic pain, or dyspareunia. No vaginal discharge  Folate intake: Patient educated.     Health Maintenance  Diabetes Screening: Patient had recent fasting glucose of 94 and gets these drawn yearly for healthy tracts.  Diet and exercise: Patient admits to having recent diet and exercise difficulties due to being in school. Patient and I discussed the importance of lifestyle changes, with particular emphasis on decreasing sugar and carbohydrate intake and increasing plant-based nutrition (for the purposes of weight loss, general health, and prevention of chronic illnesses), as well as regular cardiovascular exercise, proper sleep, and stress management. Patient verbalized understanding.  Substance Abuse: Denies  Safe in relationship.  Patient feels safe in relationship.  Seat belts, bike helmet, gun safety discussed.  Sun protection used.    Cancer screening  Cervical cancer Screening: Patient had Pap done in 2018 and reports that it was normal however her previous provider was [facility] who is now out of business.  She is going to try to obtain those records for us and provide them.  Breast Cancer Screening: Patient does have a significant family history of breast cancer with her maternal grandmother and maternal aunts all being diagnosed in their 80s.  She is unsure of her mother's history.  We discussed screening  recommendations and patient verbalizes understanding as she actually works at the [employer]  Infectious disease screening/Immunizations  --STI Screening: Patient denies screening today.  She is reports being with her fiancé for over 7 years and has no concerns at this time.  --Practices safe sex.  --Immunizations:    Influenza: *Patient gets yearly flu shots at [facility] as she is an employee.  She is current.   HPV: She has received all 3 doses.   Tetanus: Last tetanus 10/19/2015.    She  has no past medical history on file.  She  has no past surgical history on file.    Family History   Problem Relation Age of Onset   • Hypertension Father    • Diabetes Paternal Grandmother    • Diabetes Maternal Grandmother    • Cancer Maternal Grandmother    • Heart Disease Neg Hx    • Stroke Neg Hx        Social History     Social History   • Marital status: Single     Spouse name: N/A   • Number of children: N/A   • Years of education: N/A     Occupational History   • Not on file.     Social History Main Topics   • Smoking status: Never Smoker   • Smokeless tobacco: Never Used   • Alcohol use 0.0 oz/week      Comment: socially   • Drug use: No   • Sexual activity: Yes     Partners: Male     Birth control/ protection: Condom     Other Topics Concern   • Not on file     Social History Narrative   • No narrative on file       There are no active problems to display for this patient.        No current outpatient prescriptions on file.     No current facility-administered medications for this visit.      No Known Allergies    Review of Systems  Constitutional: Negative for fever, chills and malaise/fatigue.   HENT: Negative for congestion.    Eyes: Negative for pain.   Respiratory: Negative for cough and shortness of breath.    Cardiovascular: Negative for leg swelling.   Gastrointestinal: Negative for nausea, vomiting, abdominal pain and diarrhea.   Genitourinary: Negative for dysuria and hematuria.   Skin: Negative  for rash.   Neurological: Negative for dizziness, focal weakness and headaches.   Endo/Heme/Allergies: Does not bruise/bleed easily.   Psychiatric/Behavioral: Negative for depression.  The patient is not nervous/anxious.      Objective:     There were no vitals taken for this visit.  There is no height or weight on file to calculate BMI.  Wt Readings from Last 4 Encounters:   03/13/19 70.8 kg (156 lb)   03/11/19 70.8 kg (156 lb)   08/17/17 61.7 kg (136 lb)   05/26/17 66.7 kg (147 lb)       Physical Exam:  Constitutional: Well-developed and well-nourished. Not diaphoretic. No distress.   Skin: Skin is warm and dry. No rash noted.  Head: Atraumatic without lesions.  Eyes: Conjunctivae and extraocular motions are normal. Pupils are equal, round, and reactive to light. No scleral icterus.   Ears:  External ears unremarkable. Tympanic membranes clear and intact.  Nose: Nares patent. Septum midline. Turbinates without erythema nor edema. No discharge.   Mouth/Throat: Dentition is good. Tongue normal. Oropharynx is clear and moist. Posterior pharynx without erythema or exudates.  Neck: Supple, trachea midline. Normal range of motion. No thyromegaly present. No lymphadenopathy--cervical or supraclavicular.  Cardiovascular: Regular rate and rhythm, S1 and S2 without murmur, rubs, or gallops.  Lungs: Normal inspiratory effort, CTA bilaterally, no wheezes/rhonchi/rales  Abdomen: Soft, non tender, and without distention. Active bowel sounds in all four quadrants. No rebound, guarding, masses or HSM.  Extremities: No cyanosis, clubbing, erythema, nor edema. Distal pulses intact and symmetric.   Musculoskeletal: All major joints AROM full in all directions without pain.  Neurological: Alert and oriented x 3. DTRs 2+/3 and symmetric. No cranial nerve deficit. . Sensation intact. Negative Rhomberg.  Psychiatric:  Behavior, mood, and affect are appropriate.      Assessment and Plan:     1. Well adult exam   patient has had recent  labs so no labs were ordered today.  Patient healthy will follow-up in 1 year.       Labs per orders  Immunizations per orders.  Patient reports that she is up-to-date on her vaccinations and will attempt to get records of Varicella and hep A.  Patient counseled about skin care, diet, supplements, prenatal vitamins, safe sex and exercise.    Follow-up: Return in about 1 year (around 4/1/2020) for Preventative/Annual physical.

## 2019-08-20 ENCOUNTER — HOSPITAL ENCOUNTER (OUTPATIENT)
Facility: MEDICAL CENTER | Age: 29
End: 2019-08-20
Attending: NURSE PRACTITIONER
Payer: COMMERCIAL

## 2019-08-20 ENCOUNTER — OFFICE VISIT (OUTPATIENT)
Dept: MEDICAL GROUP | Facility: LAB | Age: 29
End: 2019-08-20
Payer: COMMERCIAL

## 2019-08-20 VITALS
OXYGEN SATURATION: 96 % | BODY MASS INDEX: 27.97 KG/M2 | WEIGHT: 152 LBS | SYSTOLIC BLOOD PRESSURE: 106 MMHG | HEIGHT: 62 IN | HEART RATE: 84 BPM | TEMPERATURE: 97.7 F | DIASTOLIC BLOOD PRESSURE: 64 MMHG | RESPIRATION RATE: 16 BRPM

## 2019-08-20 DIAGNOSIS — R30.0 DYSURIA: ICD-10-CM

## 2019-08-20 DIAGNOSIS — R31.9 HEMATURIA, UNSPECIFIED TYPE: ICD-10-CM

## 2019-08-20 LAB
APPEARANCE UR: NORMAL
BILIRUB UR STRIP-MCNC: NEGATIVE MG/DL
COLOR UR AUTO: YELLOW
GLUCOSE UR STRIP.AUTO-MCNC: NEGATIVE MG/DL
KETONES UR STRIP.AUTO-MCNC: NEGATIVE MG/DL
LEUKOCYTE ESTERASE UR QL STRIP.AUTO: NORMAL
NITRITE UR QL STRIP.AUTO: POSITIVE
PH UR STRIP.AUTO: 7 [PH] (ref 5–8)
PROT UR QL STRIP: NEGATIVE MG/DL
RBC UR QL AUTO: NORMAL
SP GR UR STRIP.AUTO: 1.02
UROBILINOGEN UR STRIP-MCNC: 1 MG/DL

## 2019-08-20 PROCEDURE — 87086 URINE CULTURE/COLONY COUNT: CPT

## 2019-08-20 PROCEDURE — 87077 CULTURE AEROBIC IDENTIFY: CPT

## 2019-08-20 PROCEDURE — 99214 OFFICE O/P EST MOD 30 MIN: CPT | Performed by: NURSE PRACTITIONER

## 2019-08-20 PROCEDURE — 81002 URINALYSIS NONAUTO W/O SCOPE: CPT | Performed by: NURSE PRACTITIONER

## 2019-08-20 PROCEDURE — 87186 SC STD MICRODIL/AGAR DIL: CPT

## 2019-08-20 RX ORDER — NITROFURANTOIN 25; 75 MG/1; MG/1
100 CAPSULE ORAL EVERY 12 HOURS
Qty: 10 CAP | Refills: 0 | Status: SHIPPED | OUTPATIENT
Start: 2019-08-20 | End: 2019-10-28 | Stop reason: SDUPTHER

## 2019-08-20 NOTE — PROGRESS NOTES
CC: 28 year old female who presents with DYSURIA            DYSURIA    This is a new problem. The current episode started in the past 7 days. The problem occurs constantly. The problem has been unchanged. Associated symptoms include increased urinary frequency an discomfort. Pertinent negatives include no change in bowel habit, chest pain, chills, visual change, vomiting or weakness. Nothing aggravates the symptoms. She has tried nothing for the symptoms.       LMP -  8/7/2019      Social History     Socioeconomic History   • Marital status: Single     Spouse name: Not on file   • Number of children: Not on file   • Years of education: Not on file   • Highest education level: Not on file   Occupational History   • Not on file   Social Needs   • Financial resource strain: Not on file   • Food insecurity:     Worry: Not on file     Inability: Not on file   • Transportation needs:     Medical: Not on file     Non-medical: Not on file   Tobacco Use   • Smoking status: Never Smoker   • Smokeless tobacco: Never Used   Substance and Sexual Activity   • Alcohol use: Yes     Alcohol/week: 0.0 oz     Comment: socially   • Drug use: No   • Sexual activity: Yes     Partners: Male     Birth control/protection: Condom   Lifestyle   • Physical activity:     Days per week: Not on file     Minutes per session: Not on file   • Stress: Not on file   Relationships   • Social connections:     Talks on phone: Not on file     Gets together: Not on file     Attends Hindu service: Not on file     Active member of club or organization: Not on file     Attends meetings of clubs or organizations: Not on file     Relationship status: Not on file   • Intimate partner violence:     Fear of current or ex partner: Not on file     Emotionally abused: Not on file     Physically abused: Not on file     Forced sexual activity: Not on file   Other Topics Concern   • Not on file   Social History Narrative   • Not on file       Past medical history  "was unremarkable and not pertinent to current issue    No current outpatient medications on file prior to visit.     No current facility-administered medications on file prior to visit.        Review of Systems   Constitutional: Negative for fevers or chills.   HENT: negative for cough, congestion, sore throat  Cardiovascular: Negative for chest pain.   Gastrointestinal: Negative for nausea. Negative for vomiting and change in bowel habit.   Neurological: Negative for weakness, dizziness.          Objective:   /64 (BP Location: Right arm, Patient Position: Sitting, BP Cuff Size: Adult)   Pulse 84   Temp 36.5 °C (97.7 °F) (Temporal)   Resp 16   Ht 1.575 m (5' 2\")   Wt 68.9 kg (152 lb)   SpO2 96%       Physical Exam  HEENT - PERRLA, EOMI  Neuro - alert and oriented x3. CN 2-12 grossly intact.  Lungs - CTA. No wheezes, rhonchi or rales.  Heart - regular rate and rhythm without murmur.  Abdomen - soft and non-tender, bowel sounds active x4.   No flank pain  Musculoskeletal - No lower extremity edema noted.         Lab Results   Component Value Date/Time    POCCOLOR Yellow 08/20/2019 03:40 PM    POCAPPEAR Cloudy 08/20/2019 03:40 PM    POCLEUKEST Trace 08/20/2019 03:40 PM    POCNITRITE Positive 08/20/2019 03:40 PM    POCUROBILIGE 1.0 08/20/2019 03:40 PM    POCPROTEIN Negative 08/20/2019 03:40 PM    POCURPH 7.0 08/20/2019 03:40 PM    POCBLOOD Trace 08/20/2019 03:40 PM    POCSPGRV 1.020 08/20/2019 03:40 PM    POCKETONES Negative 08/20/2019 03:40 PM    POCBILIRUBIN Negative 08/20/2019 03:40 PM    POCGLUCUA Negative 08/20/2019 03:40 PM           Assessment/Plan:     1. Dysuria    - POCT Urinalysis  - Urinalysis, Culture if Indicated; Future  - nitrofurantoin monohyd macro (MACROBID) 100 MG Cap; Take 1 Cap by mouth every 12 hours for 5 days.  Dispense: 10 Cap; Refill: 0    2. Hematuria, unspecified type    - POCT Urinalysis  - Urinalysis, Culture if Indicated; Future  - nitrofurantoin monohyd macro (MACROBID) 100 " MG Cap; Take 1 Cap by mouth every 12 hours for 5 days.  Dispense: 10 Cap; Refill: 0          Follow up in one week if no improvement, sooner if symptoms worsen.

## 2019-08-21 DIAGNOSIS — R30.0 DYSURIA: ICD-10-CM

## 2019-08-22 DIAGNOSIS — R30.0 DYSURIA: ICD-10-CM

## 2019-08-24 LAB
BACTERIA UR CULT: ABNORMAL
BACTERIA UR CULT: ABNORMAL
SIGNIFICANT IND 70042: ABNORMAL
SITE SITE: ABNORMAL
SOURCE SOURCE: ABNORMAL

## 2019-08-26 ENCOUNTER — GYNECOLOGY VISIT (OUTPATIENT)
Dept: OBGYN | Facility: CLINIC | Age: 29
End: 2019-08-26
Payer: COMMERCIAL

## 2019-08-26 ENCOUNTER — HOSPITAL ENCOUNTER (OUTPATIENT)
Facility: MEDICAL CENTER | Age: 29
End: 2019-08-26
Attending: OBSTETRICS & GYNECOLOGY
Payer: COMMERCIAL

## 2019-08-26 VITALS
BODY MASS INDEX: 28.71 KG/M2 | WEIGHT: 156 LBS | HEIGHT: 62 IN | DIASTOLIC BLOOD PRESSURE: 60 MMHG | SYSTOLIC BLOOD PRESSURE: 102 MMHG

## 2019-08-26 DIAGNOSIS — Z30.49 ENCOUNTER FOR SURVEILLANCE OF OTHER CONTRACEPTIVE: ICD-10-CM

## 2019-08-26 DIAGNOSIS — Z01.419 WELL WOMAN EXAM: ICD-10-CM

## 2019-08-26 DIAGNOSIS — R10.2 PELVIC PAIN: ICD-10-CM

## 2019-08-26 DIAGNOSIS — Z11.51 SCREENING FOR HPV (HUMAN PAPILLOMAVIRUS): ICD-10-CM

## 2019-08-26 DIAGNOSIS — Z12.4 SCREENING FOR MALIGNANT NEOPLASM OF CERVIX: ICD-10-CM

## 2019-08-26 LAB
AMBIGUOUS DTTM AMBI4: NORMAL
APPEARANCE UR: CLEAR
BILIRUB UR QL STRIP.AUTO: NEGATIVE
COLOR UR: YELLOW
GLUCOSE UR STRIP.AUTO-MCNC: NEGATIVE MG/DL
KETONES UR STRIP.AUTO-MCNC: NEGATIVE MG/DL
LEUKOCYTE ESTERASE UR QL STRIP.AUTO: NEGATIVE
MICRO URNS: NORMAL
NITRITE UR QL STRIP.AUTO: NEGATIVE
PH UR STRIP.AUTO: 6 [PH] (ref 5–8)
PROT UR QL STRIP: NEGATIVE MG/DL
RBC UR QL AUTO: NEGATIVE
SP GR UR STRIP.AUTO: 1.02
UROBILINOGEN UR STRIP.AUTO-MCNC: 0.2 MG/DL

## 2019-08-26 PROCEDURE — 81003 URINALYSIS AUTO W/O SCOPE: CPT

## 2019-08-26 PROCEDURE — 87591 N.GONORRHOEAE DNA AMP PROB: CPT

## 2019-08-26 PROCEDURE — 99385 PREV VISIT NEW AGE 18-39: CPT | Performed by: OBSTETRICS & GYNECOLOGY

## 2019-08-26 PROCEDURE — 87491 CHLMYD TRACH DNA AMP PROBE: CPT

## 2019-08-26 PROCEDURE — 88175 CYTOPATH C/V AUTO FLUID REDO: CPT

## 2019-08-26 NOTE — PROGRESS NOTES
"Cata Becerra 29 y.o.  female presents for Annual Well Woman Exam.    ROS: Patient is feeling well. No dyspnea or chest pain on exertion. No Abdominal pain, change in bowel habits, black or bloody stools. No urinary sx. GYN ROS:normal menses, no abnormal bleeding, pelvic pain or discharge, no breast pain or new or enlarging lumps on self exam, she complains of recent cramping pre menstrual; . Denies breast tenderness, mass, discharge, changes in size or contour, or abnormal cyclic discomfort. No neurological complaints.  History reviewed. No pertinent past medical history.  uses condoms  Allergy:   Patient has no known allergies.    LMP:       Patient's last menstrual period was 2019. .     Menstrual History: menses regular every 28 days  Contraceptive Method:condoms  Family History   Problem Relation Age of Onset   • Hypertension Father    • Diabetes Paternal Grandmother    • Diabetes Maternal Grandmother    • Breast Cancer Maternal Grandmother    • No Known Problems Mother    • Heart Disease Neg Hx    • Stroke Neg Hx          Objective : The patient appears well, alert and oriented x 3, in no acute distress.  /60   Ht 1.575 m (5' 2\")   Wt 70.8 kg (156 lb)   HEENT Exam: EOMI, LANA, no adenopathy or thyromegaly.  Lungs: Clear to Auscultation and Percussion.  Cor: S1 and S2 normal, no murmurs, or rubs   Abdomen: Soft without tenderness, guarding mass or organomegaly.  Extremities: No edema, pulses equal  Neurological: Normal No focal signs  Breast Exam:Inspection negative. No nipple discharge or bleeding. No masses or nodularity palpable  Pelvic: External genitalia,urethral meatus, urethra, bladder and vagina normal. Cervix, uterus and adnexa intact and normal.  Anus and perineum normal. Bimanual and rectovaginal without masses or tenderness.    Lab:  Recent Results (from the past 336 hour(s))   URINE CULTURE(NEW)    Collection Time: 19 11:44 AM   Result Value Ref Range    " Significant Indicator POS (POS)     Source UR     Site -     Culture Result - (A)     Culture Result Escherichia coli  >100,000 cfu/mL   (A)        Susceptibility    Escherichia coli - ASHWINI     Ceftriaxone <=8 Sensitive mcg/mL     Ceftazidime <=1 Sensitive mcg/mL     Cephalothin <=8 Sensitive mcg/mL     Cefotaxime <=2 Sensitive mcg/mL     Ciprofloxacin <=1 Sensitive mcg/mL     Cefepime <=8 Sensitive mcg/mL     Cefuroxime <=4 Sensitive mcg/mL     Ampicillin <=8 Sensitive mcg/mL     Cefotetan <=16 Sensitive mcg/mL     Tobramycin <=4 Sensitive mcg/mL     Nitrofurantoin <=32 Sensitive mcg/mL     Gentamicin <=4 Sensitive mcg/mL     Levofloxacin <=2 Sensitive mcg/mL     Pip/Tazobactam <=16 Sensitive mcg/mL     Piperacillin <=16 Sensitive mcg/mL     Trimeth/Sulfa <=2/38 Sensitive mcg/mL     Tigecycline <=2 Sensitive mcg/mL   POCT Urinalysis    Collection Time: 08/20/19  3:40 PM   Result Value Ref Range    POC Color Yellow Negative    POC Appearance Cloudy Negative    POC Leukocyte Esterase Trace Negative    POC Nitrites Positive Negative    POC Urobiligen 1.0 Negative (0.2) mg/dL    POC Protein Negative Negative mg/dL    POC Urine PH 7.0 5.0 - 8.0    POC Blood Trace Negative    POC Specific Gravity 1.020 <1.005 - >1.030    POC Ketones Negative Negative mg/dL    POC Bilirubin Negative Negative mg/dL    POC Glucose Negative Negative mg/dL       Assessment:  well woman  pelvi pain   Recent Hemorrhagic Cystitis , with E. Coli   Dyspareunia : Deep   Discuss the above issues need hydration , acidification   Plan:pap smear  Re Check UA  return annually or prn  Self Breast Exams  Contraception:condoms

## 2019-08-26 NOTE — NON-PROVIDER
New Patient here for Annual Exam  Last pap about 3 years ago, WNL per patient.   # [phone]  LMP 08/08/2019  Birth Control: Condoms   C/O: severe cramps during menstrual cycle and painful intercourse

## 2019-08-29 LAB
C TRACH DNA GENITAL QL NAA+PROBE: NEGATIVE
CYTOLOGY REG CYTOL: NORMAL
N GONORRHOEA DNA GENITAL QL NAA+PROBE: NEGATIVE
SPECIMEN SOURCE: NORMAL

## 2019-09-06 DIAGNOSIS — R30.0 DYSURIA: ICD-10-CM

## 2019-09-11 ENCOUNTER — HOSPITAL ENCOUNTER (OUTPATIENT)
Dept: LAB | Facility: MEDICAL CENTER | Age: 29
End: 2019-09-11
Attending: NURSE PRACTITIONER
Payer: COMMERCIAL

## 2019-09-11 DIAGNOSIS — R30.0 DYSURIA: ICD-10-CM

## 2019-09-11 LAB
APPEARANCE UR: CLEAR
BACTERIA #/AREA URNS HPF: ABNORMAL /HPF
BILIRUB UR QL STRIP.AUTO: NEGATIVE
COLOR UR: YELLOW
EPI CELLS #/AREA URNS HPF: NEGATIVE /HPF
GLUCOSE UR STRIP.AUTO-MCNC: NEGATIVE MG/DL
HYALINE CASTS #/AREA URNS LPF: ABNORMAL /LPF
KETONES UR STRIP.AUTO-MCNC: ABNORMAL MG/DL
LEUKOCYTE ESTERASE UR QL STRIP.AUTO: ABNORMAL
MICRO URNS: ABNORMAL
NITRITE UR QL STRIP.AUTO: NEGATIVE
PH UR STRIP.AUTO: 6.5 [PH] (ref 5–8)
PROT UR QL STRIP: NEGATIVE MG/DL
RBC # URNS HPF: ABNORMAL /HPF
RBC UR QL AUTO: NEGATIVE
SP GR UR STRIP.AUTO: 1.03
UROBILINOGEN UR STRIP.AUTO-MCNC: 1 MG/DL
WBC #/AREA URNS HPF: ABNORMAL /HPF

## 2019-09-11 PROCEDURE — 81001 URINALYSIS AUTO W/SCOPE: CPT

## 2019-09-21 ENCOUNTER — HOSPITAL ENCOUNTER (OUTPATIENT)
Dept: LAB | Facility: MEDICAL CENTER | Age: 29
End: 2019-09-21
Payer: COMMERCIAL

## 2019-09-21 LAB
BDY FAT % MEASURED: 32 %
BP DIAS: 61 MMHG
BP SYS: 100 MMHG
CHOLEST SERPL-MCNC: 142 MG/DL (ref 100–199)
DIABETES HTDIA: NO
EVENT NAME HTEVT: NORMAL
FASTING HTFAS: YES
GLUCOSE SERPL-MCNC: 82 MG/DL (ref 65–99)
HDLC SERPL-MCNC: 56 MG/DL
HYPERTENSION HTHYP: NO
LDLC SERPL CALC-MCNC: 76 MG/DL
SCREENING LOC CITY HTCIT: NORMAL
SCREENING LOC STATE HTSTA: NORMAL
SCREENING LOCATION HTLOC: NORMAL
SMOKING HTSMO: NO
SUBSCRIBER ID HTSID: NORMAL
TRIGL SERPL-MCNC: 51 MG/DL (ref 0–149)

## 2019-09-21 PROCEDURE — 80061 LIPID PANEL: CPT

## 2019-09-21 PROCEDURE — 36415 COLL VENOUS BLD VENIPUNCTURE: CPT

## 2019-09-21 PROCEDURE — S5190 WELLNESS ASSESSMENT BY NONPH: HCPCS

## 2019-09-21 PROCEDURE — 82947 ASSAY GLUCOSE BLOOD QUANT: CPT

## 2019-09-25 ENCOUNTER — IMMUNIZATION (OUTPATIENT)
Dept: OCCUPATIONAL MEDICINE | Facility: CLINIC | Age: 29
End: 2019-09-25

## 2019-09-25 DIAGNOSIS — Z23 NEED FOR VACCINATION: ICD-10-CM

## 2019-10-02 PROCEDURE — 90686 IIV4 VACC NO PRSV 0.5 ML IM: CPT | Performed by: PREVENTIVE MEDICINE

## 2019-10-21 DIAGNOSIS — R30.0 DYSURIA: ICD-10-CM

## 2019-10-23 ENCOUNTER — HOSPITAL ENCOUNTER (OUTPATIENT)
Facility: MEDICAL CENTER | Age: 29
End: 2019-10-23
Attending: NURSE PRACTITIONER
Payer: COMMERCIAL

## 2019-10-23 DIAGNOSIS — R30.0 DYSURIA: ICD-10-CM

## 2019-10-23 DIAGNOSIS — R31.9 HEMATURIA, UNSPECIFIED TYPE: ICD-10-CM

## 2019-10-23 LAB
APPEARANCE UR: ABNORMAL
BACTERIA #/AREA URNS HPF: ABNORMAL /HPF
BILIRUB UR QL STRIP.AUTO: NEGATIVE
COLOR UR: YELLOW
EPI CELLS #/AREA URNS HPF: NEGATIVE /HPF
GLUCOSE UR STRIP.AUTO-MCNC: NEGATIVE MG/DL
HYALINE CASTS #/AREA URNS LPF: ABNORMAL /LPF
KETONES UR STRIP.AUTO-MCNC: NEGATIVE MG/DL
LEUKOCYTE ESTERASE UR QL STRIP.AUTO: NEGATIVE
MICRO URNS: ABNORMAL
NITRITE UR QL STRIP.AUTO: NEGATIVE
PH UR STRIP.AUTO: 6.5 [PH] (ref 5–8)
PROT UR QL STRIP: NEGATIVE MG/DL
RBC # URNS HPF: ABNORMAL /HPF
RBC UR QL AUTO: ABNORMAL
SP GR UR STRIP.AUTO: 1.03
UROBILINOGEN UR STRIP.AUTO-MCNC: 1 MG/DL
WBC #/AREA URNS HPF: ABNORMAL /HPF

## 2019-10-23 PROCEDURE — 87086 URINE CULTURE/COLONY COUNT: CPT

## 2019-10-23 PROCEDURE — 81001 URINALYSIS AUTO W/SCOPE: CPT

## 2019-10-23 PROCEDURE — 87186 SC STD MICRODIL/AGAR DIL: CPT

## 2019-10-23 PROCEDURE — 87077 CULTURE AEROBIC IDENTIFY: CPT

## 2019-10-28 DIAGNOSIS — R31.9 HEMATURIA, UNSPECIFIED TYPE: ICD-10-CM

## 2019-10-28 DIAGNOSIS — R30.0 DYSURIA: ICD-10-CM

## 2019-10-28 RX ORDER — NITROFURANTOIN 25; 75 MG/1; MG/1
100 CAPSULE ORAL EVERY 12 HOURS
Qty: 10 CAP | Refills: 0 | Status: SHIPPED | OUTPATIENT
Start: 2019-10-28 | End: 2019-11-02

## 2020-02-07 ENCOUNTER — OFFICE VISIT (OUTPATIENT)
Dept: MEDICAL GROUP | Facility: LAB | Age: 30
End: 2020-02-07
Payer: COMMERCIAL

## 2020-02-07 VITALS
DIASTOLIC BLOOD PRESSURE: 60 MMHG | WEIGHT: 162 LBS | BODY MASS INDEX: 29.81 KG/M2 | TEMPERATURE: 98 F | HEIGHT: 62 IN | HEART RATE: 64 BPM | SYSTOLIC BLOOD PRESSURE: 104 MMHG

## 2020-02-07 DIAGNOSIS — R82.90 MALODOROUS URINE: ICD-10-CM

## 2020-02-07 DIAGNOSIS — R82.4 KETONURIA: ICD-10-CM

## 2020-02-07 DIAGNOSIS — Z00.00 HEALTH CARE MAINTENANCE: ICD-10-CM

## 2020-02-07 LAB
APPEARANCE UR: CLEAR
BILIRUB UR STRIP-MCNC: NEGATIVE MG/DL
COLOR UR AUTO: NORMAL
GLUCOSE UR STRIP.AUTO-MCNC: NEGATIVE MG/DL
KETONES UR STRIP.AUTO-MCNC: NORMAL MG/DL
LEUKOCYTE ESTERASE UR QL STRIP.AUTO: NEGATIVE
NITRITE UR QL STRIP.AUTO: POSITIVE
PH UR STRIP.AUTO: 6 [PH] (ref 5–8)
PROT UR QL STRIP: NEGATIVE MG/DL
RBC UR QL AUTO: NEGATIVE
SP GR UR STRIP.AUTO: >1.03
UROBILINOGEN UR STRIP-MCNC: 0.2 MG/DL

## 2020-02-07 PROCEDURE — 99214 OFFICE O/P EST MOD 30 MIN: CPT | Performed by: NURSE PRACTITIONER

## 2020-02-07 PROCEDURE — 81002 URINALYSIS NONAUTO W/O SCOPE: CPT | Performed by: NURSE PRACTITIONER

## 2020-02-07 RX ORDER — NITROFURANTOIN 25; 75 MG/1; MG/1
100 CAPSULE ORAL 2 TIMES DAILY
Qty: 10 CAP | Refills: 0 | Status: SHIPPED | OUTPATIENT
Start: 2020-02-07 | End: 2020-02-12

## 2020-02-07 RX ORDER — NITROFURANTOIN 25; 75 MG/1; MG/1
100 CAPSULE ORAL 2 TIMES DAILY
Qty: 10 CAP | Refills: 0 | Status: SHIPPED | OUTPATIENT
Start: 2020-02-07 | End: 2020-02-07

## 2020-02-07 ASSESSMENT — PATIENT HEALTH QUESTIONNAIRE - PHQ9: CLINICAL INTERPRETATION OF PHQ2 SCORE: 0

## 2020-02-07 NOTE — PROGRESS NOTES
Subjective:      CC:  presents with Dysuria    Malodorous Urine   This is a new problem. The current episode started in the past 3-4 days. The problem occurs every urination. The problem has been unchanged. She does have history of recurrent UTIs since October. There has been no fever. Pt is sexually active and does state that she feels like this is usually an issue after sexual intercourse. There is no history of pyelonephritis. Associated symptoms include frequency and urgency. Pertinent negatives include no chills, discharge, flank pain, nausea or vomiting. Pt has tried nothing for the symptoms.     Fatigue  This is a chronic problem for which patient reports she feels tired all the time, has had difficulty losing weight and reports constipation.  She does not snore.  She would like to have her thyroid checked today.  ROS is NEGATIVE for: cold or heat intolerance, anxiety/depression, chest pain/pressure, palpitations, hair thickening/coarsening/falling out/thinning, skin changes,     Social History     Socioeconomic History   • Marital status: Single     Spouse name: Not on file   • Number of children: Not on file   • Years of education: Not on file   • Highest education level: Not on file   Occupational History   • Not on file   Social Needs   • Financial resource strain: Not on file   • Food insecurity:     Worry: Not on file     Inability: Not on file   • Transportation needs:     Medical: Not on file     Non-medical: Not on file   Tobacco Use   • Smoking status: Never Smoker   • Smokeless tobacco: Never Used   Substance and Sexual Activity   • Alcohol use: Yes     Alcohol/week: 0.0 oz     Comment: socially   • Drug use: No   • Sexual activity: Yes     Partners: Male     Birth control/protection: Condom   Lifestyle   • Physical activity:     Days per week: Not on file     Minutes per session: Not on file   • Stress: Not on file   Relationships   • Social connections:     Talks on phone: Not on file     Gets  "together: Not on file     Attends Sikh service: Not on file     Active member of club or organization: Not on file     Attends meetings of clubs or organizations: Not on file     Relationship status: Not on file   • Intimate partner violence:     Fear of current or ex partner: Not on file     Emotionally abused: Not on file     Physically abused: Not on file     Forced sexual activity: Not on file   Other Topics Concern   • Not on file   Social History Narrative   • Not on file         Family History   Problem Relation Age of Onset   • Hypertension Father    • Diabetes Paternal Grandmother    • Diabetes Maternal Grandmother    • Breast Cancer Maternal Grandmother    • No Known Problems Mother    • Heart Disease Neg Hx    • Stroke Neg Hx          No Known Allergies        No current outpatient medications on file prior to visit.     No current facility-administered medications on file prior to visit.        Review of Systems   Constitutional: Negative for chills.   Respiratory: Negative for shortness of breath.    Cardiovascular: Negative for chest pain.   Gastrointestinal: Negative for nausea, vomiting and abdominal pain.   Genitourinary: Positive for malodorous urine. Negative for flank pain.   Skin: Negative for rash.   Neurological: Negative for dizziness and headaches.   All other systems reviewed and are negative.         Objective:      /60 (BP Location: Left arm, Patient Position: Sitting, BP Cuff Size: Adult)   Pulse 64   Temp 36.7 °C (98 °F) (Temporal)   Ht 1.575 m (5' 2\")   Wt 73.5 kg (162 lb)       Physical Exam   Constitutional: pt is oriented to person, place, and time. Pt appears well-developed and well-nourished. No distress.   HENT:   Head: Normocephalic and atraumatic.   Mouth/Throat: Mucous membranes are normal.   Eyes: Conjunctivae and EOM are normal. Pupils are equal, round, and reactive to light. Right eye exhibits no discharge. Left eye exhibits no discharge. No scleral icterus. "   Neck: Normal range of motion. Neck supple.   Cardiovascular: Normal rate, regular rhythm, normal heart sounds and intact distal pulses.    No murmur heard.  Pulmonary/Chest: Effort normal and breath sounds normal. No respiratory distress. Pt has no wheezes,  rales.   Abdominal: Bowel sounds are normal. Pt exhibits no distension and no mass. There is no tenderness. There is no rebound, no guarding and no CVA tenderness.   Neurological: pt is alert and oriented to person, place, and time.   Skin: Skin is warm and dry.   Psychiatric: behavior is normal.   Nursing note and vitals reviewed.           Lab Results   Component Value Date/Time    POCCOLOR [place] 02/07/2020 03:50 PM    POCAPPEAR Clear 02/07/2020 03:50 PM    POCLEUKEST Negative 02/07/2020 03:50 PM    POCNITRITE Positive 02/07/2020 03:50 PM    POCUROBILIGE 0.2 02/07/2020 03:50 PM    POCPROTEIN Negative 02/07/2020 03:50 PM    POCURPH 6.0 02/07/2020 03:50 PM    POCBLOOD Negative 02/07/2020 03:50 PM    POCSPGRV >1.030 02/07/2020 03:50 PM    POCKETONES 40mg 02/07/2020 03:50 PM    POCBILIRUBIN Negative 02/07/2020 03:50 PM    POCGLUCUA Negative 02/07/2020 03:50 PM            Assessment/Plan:     1. Malodorous urine  Acute Urinary Tract Infection. (Acute cystitis)  Clinical history, physical examination and urinalysis consistent with urinary tract infection. Differential diagnosis includes urinary tract infection, vulvovaginitis, urethral trauma or irritation, interstitial cystitis, vaginal atrophy, urethritis or psychogenic dysuria. Patient education provided with encouragement of good hydration.   - Macrobid 100 mg BID for 5 days  - Tylenol/ Ibuprofen, PRN    Patient I had a long discussion today about atypical urinalysis with nitrites and no leukocytes.  Patient has several urine culture showing E. coli over the last several months with similar symptoms however this time she presented as soon as she had a urine odor.  I do believe she may end up needing referral  to urology but she would like to hold off and do labs for now.  She does have f/u with new PCP in March    Return to clinic in 3-5 days if no improvement or worsening symptoms, PRN  - POCT Urinalysis  - nitrofurantoin monohyd macro (MACROBID) 100 MG Cap; Take 1 Cap by mouth 2 times a day for 5 days.  Dispense: 10 Cap; Refill: 0    2. Ketonuria  Unknown etiology.  No history of DM.  Patient reports she is well hydrated, not undergoing any fasting regimen or special diet.  Eats a varied regular diet.  - HEMOGLOBIN A1C; Future     3.  Fatigue  - CBC WITH DIFFERENTIAL; Future  - Comp Metabolic Panel; Future  - TSH WITH REFLEX TO FT4; Future  - VITAMIN D,25 HYDROXY; Future  - HEMOGLOBIN A1C; Future    Return in about 4 weeks (around 3/6/2020) for Routine Follow up.    Please note that this dictation was created using voice recognition software. I have made every reasonable attempt to correct obvious errors, but I expect that there are errors of grammar and possibly content that I did not discover before finalizing the note.

## 2020-02-11 ENCOUNTER — HOSPITAL ENCOUNTER (OUTPATIENT)
Dept: LAB | Facility: MEDICAL CENTER | Age: 30
End: 2020-02-11
Attending: NURSE PRACTITIONER
Payer: COMMERCIAL

## 2020-02-11 DIAGNOSIS — Z00.00 HEALTH CARE MAINTENANCE: ICD-10-CM

## 2020-02-11 LAB
BASOPHILS # BLD AUTO: 0.6 % (ref 0–1.8)
BASOPHILS # BLD: 0.03 K/UL (ref 0–0.12)
EOSINOPHIL # BLD AUTO: 0.1 K/UL (ref 0–0.51)
EOSINOPHIL NFR BLD: 1.9 % (ref 0–6.9)
ERYTHROCYTE [DISTWIDTH] IN BLOOD BY AUTOMATED COUNT: 39.8 FL (ref 35.9–50)
EST. AVERAGE GLUCOSE BLD GHB EST-MCNC: 103 MG/DL
HBA1C MFR BLD: 5.2 % (ref 0–5.6)
HCT VFR BLD AUTO: 44.8 % (ref 37–47)
HGB BLD-MCNC: 14.9 G/DL (ref 12–16)
IMM GRANULOCYTES # BLD AUTO: 0.02 K/UL (ref 0–0.11)
IMM GRANULOCYTES NFR BLD AUTO: 0.4 % (ref 0–0.9)
LYMPHOCYTES # BLD AUTO: 1.35 K/UL (ref 1–4.8)
LYMPHOCYTES NFR BLD: 25.7 % (ref 22–41)
MCH RBC QN AUTO: 31.4 PG (ref 27–33)
MCHC RBC AUTO-ENTMCNC: 33.3 G/DL (ref 33.6–35)
MCV RBC AUTO: 94.3 FL (ref 81.4–97.8)
MONOCYTES # BLD AUTO: 0.48 K/UL (ref 0–0.85)
MONOCYTES NFR BLD AUTO: 9.1 % (ref 0–13.4)
NEUTROPHILS # BLD AUTO: 3.27 K/UL (ref 2–7.15)
NEUTROPHILS NFR BLD: 62.3 % (ref 44–72)
NRBC # BLD AUTO: 0 K/UL
NRBC BLD-RTO: 0 /100 WBC
PLATELET # BLD AUTO: 280 K/UL (ref 164–446)
PMV BLD AUTO: 9.7 FL (ref 9–12.9)
RBC # BLD AUTO: 4.75 M/UL (ref 4.2–5.4)
WBC # BLD AUTO: 5.3 K/UL (ref 4.8–10.8)

## 2020-02-11 PROCEDURE — 84443 ASSAY THYROID STIM HORMONE: CPT

## 2020-02-11 PROCEDURE — 36415 COLL VENOUS BLD VENIPUNCTURE: CPT

## 2020-02-11 PROCEDURE — 83036 HEMOGLOBIN GLYCOSYLATED A1C: CPT

## 2020-02-11 PROCEDURE — 80053 COMPREHEN METABOLIC PANEL: CPT

## 2020-02-11 PROCEDURE — 85025 COMPLETE CBC W/AUTO DIFF WBC: CPT

## 2020-02-11 PROCEDURE — 82306 VITAMIN D 25 HYDROXY: CPT

## 2020-02-12 LAB
25(OH)D3 SERPL-MCNC: 19 NG/ML (ref 30–100)
ALBUMIN SERPL BCP-MCNC: 4.5 G/DL (ref 3.2–4.9)
ALBUMIN/GLOB SERPL: 1.5 G/DL
ALP SERPL-CCNC: 57 U/L (ref 30–99)
ALT SERPL-CCNC: 11 U/L (ref 2–50)
ANION GAP SERPL CALC-SCNC: 7 MMOL/L (ref 0–11.9)
AST SERPL-CCNC: 14 U/L (ref 12–45)
BILIRUB SERPL-MCNC: 0.5 MG/DL (ref 0.1–1.5)
BUN SERPL-MCNC: 18 MG/DL (ref 8–22)
CALCIUM SERPL-MCNC: 9.2 MG/DL (ref 8.5–10.5)
CHLORIDE SERPL-SCNC: 106 MMOL/L (ref 96–112)
CO2 SERPL-SCNC: 26 MMOL/L (ref 20–33)
CREAT SERPL-MCNC: 0.66 MG/DL (ref 0.5–1.4)
FASTING STATUS PATIENT QL REPORTED: NORMAL
GLOBULIN SER CALC-MCNC: 3 G/DL (ref 1.9–3.5)
GLUCOSE SERPL-MCNC: 94 MG/DL (ref 65–99)
POTASSIUM SERPL-SCNC: 4.7 MMOL/L (ref 3.6–5.5)
PROT SERPL-MCNC: 7.5 G/DL (ref 6–8.2)
SODIUM SERPL-SCNC: 139 MMOL/L (ref 135–145)
TSH SERPL DL<=0.005 MIU/L-ACNC: 1.58 UIU/ML (ref 0.38–5.33)

## 2020-03-24 ENCOUNTER — APPOINTMENT (OUTPATIENT)
Dept: MEDICAL GROUP | Facility: MEDICAL CENTER | Age: 30
End: 2020-03-24
Payer: COMMERCIAL

## 2020-06-23 ENCOUNTER — OFFICE VISIT (OUTPATIENT)
Dept: URGENT CARE | Facility: CLINIC | Age: 30
End: 2020-06-23
Payer: COMMERCIAL

## 2020-06-23 VITALS
WEIGHT: 162 LBS | SYSTOLIC BLOOD PRESSURE: 100 MMHG | HEART RATE: 81 BPM | BODY MASS INDEX: 29.81 KG/M2 | TEMPERATURE: 97.7 F | DIASTOLIC BLOOD PRESSURE: 70 MMHG | RESPIRATION RATE: 16 BRPM | HEIGHT: 62 IN | OXYGEN SATURATION: 97 %

## 2020-06-23 DIAGNOSIS — G89.29 CHRONIC NONINTRACTABLE HEADACHE, UNSPECIFIED HEADACHE TYPE: ICD-10-CM

## 2020-06-23 DIAGNOSIS — R51.9 CHRONIC NONINTRACTABLE HEADACHE, UNSPECIFIED HEADACHE TYPE: ICD-10-CM

## 2020-06-23 DIAGNOSIS — R11.0 NAUSEA: ICD-10-CM

## 2020-06-23 DIAGNOSIS — R42 DIZZINESS: ICD-10-CM

## 2020-06-23 PROCEDURE — 99214 OFFICE O/P EST MOD 30 MIN: CPT | Performed by: NURSE PRACTITIONER

## 2020-06-23 RX ORDER — ONDANSETRON 4 MG/1
4 TABLET, ORALLY DISINTEGRATING ORAL EVERY 6 HOURS PRN
Qty: 10 TAB | Refills: 0 | Status: SHIPPED | OUTPATIENT
Start: 2020-06-23 | End: 2020-06-26

## 2020-06-23 RX ORDER — KETOROLAC TROMETHAMINE 30 MG/ML
60 INJECTION, SOLUTION INTRAMUSCULAR; INTRAVENOUS ONCE
Status: COMPLETED | OUTPATIENT
Start: 2020-06-23 | End: 2020-06-23

## 2020-06-23 RX ADMIN — KETOROLAC TROMETHAMINE 60 MG: 30 INJECTION, SOLUTION INTRAMUSCULAR; INTRAVENOUS at 10:16

## 2020-06-23 ASSESSMENT — ENCOUNTER SYMPTOMS
WEAKNESS: 0
BLURRED VISION: 0
HEADACHES: 1
TINGLING: 0
NAUSEA: 1
DIZZINESS: 1
CONSTITUTIONAL NEGATIVE: 1
SENSORY CHANGE: 0
SINUS PRESSURE: 0
VOMITING: 0

## 2020-06-23 ASSESSMENT — VISUAL ACUITY: OU: 1

## 2020-06-23 ASSESSMENT — FIBROSIS 4 INDEX: FIB4 SCORE: 0.42

## 2020-06-23 NOTE — LETTER
June 23, 2020         Patient: Cata Becerra   YOB: 1990   Date of Visit: 6/23/2020           To Whom it May Concern:    Cata Becerra was seen in my clinic on 6/23/2020 due to illness. Due to medical necessity, please excuse patient from work for up to the next 3 days as needed.    If you have any questions or concerns, please don't hesitate to call.        Sincerely,           FERNANDO Jewell.  Electronically Signed

## 2020-06-23 NOTE — PROGRESS NOTES
Subjective:     Cata Becerra is a 29 y.o. female who presents for Dizziness (nausea, headaches for 1 week. history with these symptoms.)       Headache    This is a new (Chronic and recurrent) problem. Episode onset: 1 week ago for this episode. The problem has been unchanged. The pain is located in the frontal region. The pain does not radiate. The pain is moderate. Associated symptoms include dizziness and nausea. Pertinent negatives include no blurred vision, sinus pressure, tingling, vomiting or weakness. Nothing aggravates the symptoms. She has tried acetaminophen (Ice) for the symptoms. The treatment provided no relief. (Chronic headaches)     PMH:  has no past medical history of Addisons disease (HCC), CHF (congestive heart failure) ([facility]), Head ache, or Osteoporosis.    MEDS:   Current Outpatient Medications:   •  ondansetron (ZOFRAN ODT) 4 MG TABLET DISPERSIBLE, Take 1 Tab by mouth every 6 hours as needed for Nausea. Dissolve in mouth., Disp: 10 Tab, Rfl: 0    Current Facility-Administered Medications:   •  ketorolac (TORADOL) injection 60 mg, 60 mg, Intramuscular, Once, Broderick Laughlin, A.P.R.N.    ALLERGIES: No Known Allergies    SURGHX: History reviewed. No pertinent surgical history.    SOCHX:  reports that she has never smoked. She has never used smokeless tobacco. She reports current alcohol use. She reports that she does not use drugs.     FH: Reviewed with patient, not pertinent to this visit.    Review of Systems   Constitutional: Negative.    HENT: Negative for sinus pressure.    Eyes: Negative for blurred vision.   Gastrointestinal: Positive for nausea. Negative for vomiting.   Neurological: Positive for dizziness and headaches. Negative for tingling, sensory change and weakness.   All other systems reviewed and are negative.    Additional details per HPI.      Objective:     /70 (BP Location: Left arm, Patient Position: Sitting, BP Cuff Size: Adult)   Pulse 81   Temp 36.5 °C  "(97.7 °F) (Temporal)   Resp 16   Ht 1.575 m (5' 2\")   Wt 73.5 kg (162 lb)   SpO2 97%   BMI 29.63 kg/m²     Physical Exam  Vitals signs reviewed.   Constitutional:       General: She is not in acute distress.     Appearance: She is well-developed. She is not ill-appearing or toxic-appearing.   HENT:      Head: Normocephalic.      Right Ear: External ear normal.      Left Ear: External ear normal.      Nose: Nose normal.   Eyes:      General: Vision grossly intact.      Extraocular Movements: Extraocular movements intact.      Conjunctiva/sclera: Conjunctivae normal.      Pupils: Pupils are equal, round, and reactive to light.   Neck:      Musculoskeletal: Normal range of motion.   Cardiovascular:      Rate and Rhythm: Normal rate.   Pulmonary:      Effort: Pulmonary effort is normal. No respiratory distress.   Musculoskeletal: Normal range of motion.         General: No deformity.      Right hand: Normal sensation noted. Normal strength noted.      Left hand: Normal sensation noted. Normal strength noted.   Skin:     General: Skin is warm and dry.      Coloration: Skin is not pale.   Neurological:      General: No focal deficit present.      Mental Status: She is alert and oriented to person, place, and time.      Sensory: No sensory deficit.      Motor: No weakness.      Coordination: Coordination normal.   Psychiatric:         Behavior: Behavior normal. Behavior is cooperative.       Assessment/Plan:     1. Chronic nonintractable headache, unspecified headache type  - ketorolac (TORADOL) injection 60 mg  - REFERRAL TO NEUROLOGY    2. Dizziness  - REFERRAL TO NEUROLOGY    3. Nausea  - ondansetron (ZOFRAN ODT) 4 MG TABLET DISPERSIBLE; Take 1 Tab by mouth every 6 hours as needed for Nausea. Dissolve in mouth.  Dispense: 10 Tab; Refill: 0      Toradol IM given in clinic. Patient tolerated well.    Continue with acetaminophen alternating with ibuprofen per 's instructions. Rx as above sent " electronically. Work note provided. Referral for neurology evaluation and treatment of chronic/recurrent headaches and dizziness.    Differential diagnosis, natural history, supportive care, over-the-counter symptom management per 's instructions, close monitoring, and indications for immediate follow-up discussed.     Vital signs stable, afebrile, asymptomatic, no acute distress at this time.    Warning signs reviewed. Return precautions discussed.    Discharge summary provided.     Patient advised to: Return for 1) Symptoms don't improve or worsen, or go to ER, 2) Follow up with primary care in 7-10 days.    All questions answered. Patient agrees with the plan of care.

## 2020-06-26 ENCOUNTER — OFFICE VISIT (OUTPATIENT)
Dept: MEDICAL GROUP | Facility: IMAGING CENTER | Age: 30
End: 2020-06-26
Payer: COMMERCIAL

## 2020-06-26 VITALS
RESPIRATION RATE: 14 BRPM | HEIGHT: 62 IN | BODY MASS INDEX: 30.55 KG/M2 | TEMPERATURE: 97.8 F | OXYGEN SATURATION: 99 % | HEART RATE: 77 BPM | WEIGHT: 166 LBS | SYSTOLIC BLOOD PRESSURE: 92 MMHG | DIASTOLIC BLOOD PRESSURE: 64 MMHG

## 2020-06-26 DIAGNOSIS — Z76.89 ENCOUNTER TO ESTABLISH CARE WITH NEW DOCTOR: ICD-10-CM

## 2020-06-26 DIAGNOSIS — F41.9 ANXIETY: ICD-10-CM

## 2020-06-26 DIAGNOSIS — G43.009 MIGRAINE WITHOUT AURA AND WITHOUT STATUS MIGRAINOSUS, NOT INTRACTABLE: ICD-10-CM

## 2020-06-26 DIAGNOSIS — H55.09 HORIZONTAL NYSTAGMUS: ICD-10-CM

## 2020-06-26 PROCEDURE — 99214 OFFICE O/P EST MOD 30 MIN: CPT | Performed by: NURSE PRACTITIONER

## 2020-06-26 SDOH — HEALTH STABILITY: MENTAL HEALTH: HOW OFTEN DO YOU HAVE A DRINK CONTAINING ALCOHOL?: MONTHLY OR LESS

## 2020-06-26 SDOH — HEALTH STABILITY: MENTAL HEALTH: HOW OFTEN DO YOU HAVE 6 OR MORE DRINKS ON ONE OCCASION?: NEVER

## 2020-06-26 SDOH — HEALTH STABILITY: MENTAL HEALTH: HOW MANY STANDARD DRINKS CONTAINING ALCOHOL DO YOU HAVE ON A TYPICAL DAY?: 3 OR 4

## 2020-06-26 ASSESSMENT — FIBROSIS 4 INDEX: FIB4 SCORE: 0.42

## 2020-06-26 ASSESSMENT — PAIN SCALES - GENERAL: PAINLEVEL: NO PAIN

## 2020-06-29 NOTE — PROGRESS NOTES
Subjective:   CC: Establish Care (Dr Diaz was previous doctor.) and Dizziness (headaches and dizzy and nausoue last week. torodol shot and neurologist referrl.  ibuprofen for headaches.  over the weekend - BP 95/60)    HISTORY OF THE PRESENT ILLNESS: Patient is a 29 y.o. female. Her prior PCP was STEPHON Banks, last seen 2/2020.  Patient has history of anxiety, migraines, and allergies. Patient is here today to establish care and discuss ongoing anxiety and migraines.     Anxiety  States she has ongoing anxiety that causes her to go into a handwashing routine. States when she gets anxious she will wash her hands until she feels they are clean. States this routine is affecting her and her fiances relationship. States her anxiety has continued to worsen over the past 6 years.  States she is interested in starting counseling at this time.  States she would like to have counseling outside of [facility] due to concerns of her identity being known.    Migraine  States over the past two years she has had ongoing migraines and headaches. Denies with aura. States she has several headaches a week and migraines occasionally. States she recently was seen at urgent care on 6/23/2020 for pain management of a headache. States with her headaches she feels she gets dizzy and nauseated. States she will take Ibuprofen 200-800 mg intermittently for headaches. Denies regular use. States she uses a computer throughout her work day. Denies any corrective eyewear.     Allergies: Patient has no known allergies.    No current [facility]-ordered outpatient medications on file.     No current [facility]-ordered facility-administered medications on file.      Past Medical History:   Diagnosis Date   • Allergy     seasonal allergies   • Head ache    • Migraine      Past Surgical History:   Procedure Laterality Date   • OTHER  2012    removal of wisdom teeth     Social History     Tobacco Use   • Smoking status: Never Smoker   • Smokeless  "tobacco: Never Used   Substance Use Topics   • Alcohol use: Yes     Alcohol/week: 0.6 oz     Types: 1 Glasses of wine per week     Frequency: Monthly or less     Drinks per session: 3 or 4     Binge frequency: Never     Comment: socially   • Drug use: No     Social History     Social History Narrative   • Not on file     Family History   Problem Relation Age of Onset   • Hypertension Father    • Diabetes Paternal Grandmother    • Diabetes Maternal Grandmother    • Breast Cancer Maternal Grandmother         70's   • No Known Problems Mother    • No Known Problems Sister    • No Known Problems Brother    • No Known Problems Sister    • No Known Problems Brother    • No Known Problems Brother    • No Known Problems Brother    • Heart Disease Neg Hx    • Stroke Neg Hx      Health Maintenance: Completed.    ROS:   Constitutional: Denies fever, chills, night sweats, weight loss/gain or malaise/fatigue.   HENT: Denies nasal congestion, sore throat, hearing loss, enlarged thyroid, or difficulty swallowing.    Eyes: Denies changes in vision, pain. Denies  wear corrective wear.   Respiratory: Denies cough, SOB at rest or activity.    Cardiovascular: Denies tachycardia, chest pain, palpitations, or  leg swelling.   Gastrointestinal: Denies N/V/C/D, abdominal pain, loss appetite, reflux, or hematochezia.  Genitourinary: Denies difficulty voiding, dysuria, nocturia, or hematuria.   Skin: Negative for rash or worrisome moles.   Neurological: Negative for focal weakness. History of migraines with dizziness., see HPI.  Endo/Heme/Allergies: Denies bruise/bleed easily. Seasonal allergies.   Psychiatric/Behavioral: Denies depression difficulty sleeping. Anxiety, see HPI.     Objective:   Exam: BP (!) 92/64 (BP Location: Right arm, Patient Position: Sitting, BP Cuff Size: Adult)   Pulse 77   Temp 36.6 °C (97.8 °F) (Temporal)   Resp 14   Ht 1.575 m (5' 2\")   Wt 75.3 kg (166 lb)   SpO2 99%  Body mass index is 30.36 " kg/m².    General: Normal appearing. No distress.  HEENT: Normocephalic. Eyes conjunctiva clear lids without ptosis, PERRLA, ears normal shape and contour, canals are clear bilaterally, tympanic membranes pearly gray, intact, non-bulging, no drainage noted, no turbinate erythema, no polyps visible, oropharynx is without erythema, edema or exudates. Teeth intact. Patient wore a mask during visit. Bilateral horizontal nystagmus noted when following PCP finger to left and right.   Neck: Supple without JVD or abnormal masses. Small soft mobile thyroid palpated, no nodules palpated, non-tender.  Pulmonary: Clear to ausculation.  Normal effort. No rales, ronchi, or wheezing.  Cardiovascular: Regular rate and rhythm without murmur. Pedal and radial pulses are intact and equal bilaterally.  Abdomen: Soft, nontender, nondistended. Normal bowel sounds. Liver and spleen are not palpable.  Neurologic: CN 2-12 are grossly intact. Sensory intact.  Lymph: No cervical, submandibular, or supraclavicular lymph nodes are palpable.  Skin: Warm and dry.  No obvious lesions.  Musculoskeletal: Normal gait. No extremity cyanosis, clubbing, or edema.  Psych: Normal mood and affect. Alert and oriented x3. Judgment and insight is normal.    Assessment & Plan:   1. Encounter to establish care with new doctor  Medical, past, surgical history reviewed with patient.  Discussed CDC recommendations and USPSTF guidelines for screening exams.  Verbalized understanding. Encouraged patient to wash hands regularly and avoid sick contacts while supporting immune system.    2. Anxiety  This is a chronic stable condition. Discussed and encouraged to practice breathing exercises when she identifies her anxiety increasing, verbalized understanding. Discussed the benefit of improving diet and exercise regimen,  verbalized understanding. Discussed benefit of counseling with patient, verbalized understanding. Discussed HIPPA with patient that her personal and  medical information will be protected wherever she is seen, verbalized understanding.  Discussed seeking emergency services if she experiences worse symptoms. Discussed with patient if counseling does not improve anxiety and routine behavior than at future appointment will discuss adjunctive medication, verbalized understanding and agrees to plan of care.    -     REFERRAL TO BEHAVIORAL HEALTH    3. Migraine without aura and without status migrainosus, not intractable  4. 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  Discussed physical assessment findings of nystagmus with patient and this could be the cause of dizziness, verbalized understanding. Discussed referral to ophthalmology for vision check. Discussed preventative therapy and abortive therapies with patient. Instructed to take 400 mg of Magnesium daily as tolerated. Discussed starting at 100 mg and titrate to 400 mg if tolerated. Discussed the benefit and side effects of Magnesium. Discussed starting Riboflavins daily to prevent migraines, verbalized understanding. Instructed to take 200 mg of OTC CoQ10 every evening.  Discussed the importance of diet and water intake to decrease severity and frequency of migraines.  Instructed to slowly decrease caffeine intake to avoid rebound migraine. Encouraged to do neck stretches regularly, and to practice mindfulness and meditation to help relieve and stress triggers. Discussed not taking NSAIDs more than 3 days in row due to rebound head potential when stopped, verbalized understanding. Discussed possible side effects of GI upset and GI bleeding with long-term NSAID use, verbalized understanding. Instructed to take NSAIDs with food. Instructed to follow through with neurology referral from urgent care, verbalized understanding and willingness.    -     REFERRAL TO OPHTHALMOLOGY      Patient was seen for 25 minutes face to face of which, at least 50% of the time was spent counseling regarding  above.      Return in about 6 weeks (around 8/7/2020).    Please note that this dictation was created using voice recognition software. I have made every reasonable attempt to correct obvious errors, but I expect that there are errors of grammar and possibly content that I did not discover before finalizing the note.

## 2020-07-07 ENCOUNTER — OFFICE VISIT (OUTPATIENT)
Dept: NEUROLOGY | Facility: MEDICAL CENTER | Age: 30
End: 2020-07-07
Payer: COMMERCIAL

## 2020-07-07 VITALS
BODY MASS INDEX: 31.44 KG/M2 | SYSTOLIC BLOOD PRESSURE: 108 MMHG | HEIGHT: 62 IN | OXYGEN SATURATION: 98 % | DIASTOLIC BLOOD PRESSURE: 70 MMHG | WEIGHT: 170.86 LBS | HEART RATE: 78 BPM | TEMPERATURE: 98 F | RESPIRATION RATE: 16 BRPM

## 2020-07-07 DIAGNOSIS — G43.009 MIGRAINE WITHOUT AURA AND WITHOUT STATUS MIGRAINOSUS, NOT INTRACTABLE: ICD-10-CM

## 2020-07-07 DIAGNOSIS — G44.59 OTHER COMPLICATED HEADACHE SYNDROME: ICD-10-CM

## 2020-07-07 PROBLEM — G89.29 CHRONIC NONINTRACTABLE HEADACHE: Status: ACTIVE | Noted: 2020-07-07

## 2020-07-07 PROBLEM — R51.9 CHRONIC NONINTRACTABLE HEADACHE: Status: ACTIVE | Noted: 2020-07-07

## 2020-07-07 PROCEDURE — 99204 OFFICE O/P NEW MOD 45 MIN: CPT | Performed by: NURSE PRACTITIONER

## 2020-07-07 RX ORDER — RIZATRIPTAN BENZOATE 10 MG/1
TABLET ORAL
Qty: 9 TAB | Refills: 3 | Status: SHIPPED | OUTPATIENT
Start: 2020-07-07 | End: 2021-04-26

## 2020-07-07 RX ORDER — CHOLECALCIFEROL (VITAMIN D3) 1250 MCG
CAPSULE ORAL
COMMUNITY
Start: 2020-06-26 | End: 2021-03-31

## 2020-07-07 RX ORDER — UBIDECARENONE 200 MG
1 CAPSULE ORAL
COMMUNITY
Start: 2020-06-26

## 2020-07-07 ASSESSMENT — ENCOUNTER SYMPTOMS
NERVOUS/ANXIOUS: 1
VOMITING: 0
WEAKNESS: 0
SEIZURES: 0
WHEEZING: 0
HEARTBURN: 0
MYALGIAS: 1
ABDOMINAL PAIN: 0
FOCAL WEAKNESS: 0
PHOTOPHOBIA: 1
COUGH: 0
DIZZINESS: 1
PALPITATIONS: 0
BRUISES/BLEEDS EASILY: 1
INSOMNIA: 0
BLURRED VISION: 1
DEPRESSION: 0
SPEECH CHANGE: 0
NAUSEA: 0
SINUS PAIN: 0
FEVER: 0
CHILLS: 1
DOUBLE VISION: 0
HEADACHES: 1

## 2020-07-07 ASSESSMENT — FIBROSIS 4 INDEX: FIB4 SCORE: 0.42

## 2020-07-07 NOTE — PATIENT INSTRUCTIONS
For headache:  Try rizatriptan (Maxalt) 10mg tablet, start with 1/2 tab at onset of headache, may repeat x 1 in 2 hours, no more than 2 tabs in 24  Hours, no more than 9 tablets per month.      Over the counter preventatives:    Start magnesium oxide 400mg by mouth every night, may take extra dose if needed for headache (over the counter), hold for diarrhea         Start Riboflavin (Vitamin B2) 400mg by mouth every night (over the counter),may turn urine bright yellow         Start COQ 10, take 200mg every night. (over the counter)          Attempt to go to bed and get up at the same time every night           Eat meals on regular basis            Stay hydrated.             Aerobic exercise 30 minutes daily             Avoid aged or smoked foods, avoid processed foods, red wine, aged cheese              Keep headache diary, include foods that you may have eaten.             Avoid overusing over the counter medications:  Do not take more than 500mg acetaminophen (tylenol), more than 4 times weekly, more frequent or larger doses are associated with medication overuse headache.          General Headache Without Cause  A headache is pain or discomfort that is felt around the head or neck area. There are many causes and types of headaches. In some cases, the cause may not be found.  Follow these instructions at home:  Watch your condition for any changes. Let your doctor know about them. Take these steps to help with your condition:  Managing pain         · Take over-the-counter and prescription medicines only as told by your doctor.  · Lie down in a dark, quiet room when you have a headache.  · If told, put ice on your head and neck area:  ? Put ice in a plastic bag.  ? Place a towel between your skin and the bag.  ? Leave the ice on for 20 minutes, 2-3 times per day.  · If told, put heat on the affected area. Use the heat source that your doctor recommends, such as a moist heat pack or a heating pad.  ? Place a towel  between your skin and the heat source.  ? Leave the heat on for 20-30 minutes.  ? Remove the heat if your skin turns bright red. This is very important if you are unable to feel pain, heat, or cold. You may have a greater risk of getting burned.  · Keep lights dim if bright lights bother you or make your headaches worse.  Eating and drinking  · Eat meals on a regular schedule.  · If you drink alcohol:  ? Limit how much you use to:  § 0-1 drink a day for women.  § 0-2 drinks a day for men.  ? Be aware of how much alcohol is in your drink. In the [place] one drink equals one 12 oz bottle of beer (355 mL), one 5 oz glass of wine (148 mL), or one 1½ oz glass of hard liquor (44 mL).  · Stop drinking caffeine, or reduce how much caffeine you drink.  General instructions    · Keep a journal to find out if certain things bring on headaches. For example, write down:  ? What you eat and drink.  ? How much sleep you get.  ? Any change to your diet or medicines.  · Get a massage or try other ways to relax.  · Limit stress.  · Sit up straight. Do not tighten (tense) your muscles.  · Do not use any products that contain nicotine or tobacco. This includes cigarettes, e-cigarettes, and chewing tobacco. If you need help quitting, ask your doctor.  · Exercise regularly as told by your doctor.  · Get enough sleep. This often means 7-9 hours of sleep each night.  · Keep all follow-up visits as told by your doctor. This is important.  Contact a doctor if:  · Your symptoms are not helped by medicine.  · You have a headache that feels different than the other headaches.  · You feel sick to your stomach (nauseous) or you throw up (vomit).  · You have a fever.  Get help right away if:  · Your headache gets very bad quickly.  · Your headache gets worse after a lot of physical activity.  · You keep throwing up.  · You have a stiff neck.  · You have trouble seeing.  · You have trouble speaking.  · You have pain in the eye or ear.  · Your muscles  are weak or you lose muscle control.  · You lose your balance or have trouble walking.  · You feel like you will pass out (faint) or you pass out.  · You are mixed up (confused).  · You have a seizure.  Summary  · A headache is pain or discomfort that is felt around the head or neck area.  · There are many causes and types of headaches. In some cases, the cause may not be found.  · Keep a journal to help find out what causes your headaches. Watch your condition for any changes. Let your doctor know about them.  · Contact a doctor if you have a headache that is different from usual, or if your headache is not helped by medicine.  · Get help right away if your headache gets very bad, you throw up, you have trouble seeing, you lose your balance, or you have a seizure.  This information is not intended to replace advice given to you by your health care provider. Make sure you discuss any questions you have with your health care provider.  Document Released: 09/26/2009 Document Revised: 07/08/2019 Document Reviewed: 07/08/2019  Elsevier Patient Education © 2020 [employer]

## 2020-07-07 NOTE — PROGRESS NOTES
Subjective:    REBECCA Becerra is a 29 y.o. female who presents with chronic headaches and some migraines.   She is concerned that her headaches have increased and she has more dizziness with the headaches.  She has an appointment with the ophthalmologist to have eyes checked.        Onset:  Age 9, since 2014 the headaches have become more frequent and more severe.  Triggers:  Wakes up with them sometimes, alcohol, skipping meals.  Alleviating factors:  rest  Meds tried and result:  Has never been on a preventative, tried Imitrex once made it feel like her neck was disconnected from her body and made her feel groggy.  Hormones:   None  Caffeine use:  1-2 cups of coffee daily  OTC medications--frequency:  Ibuprofen once weekly  Smoking:  None  Alcohol:    Twice monthly 1-2 drinks.  Sleep apnea:  No Dx.  Family Hx:   No headache, migraine or aneurysm history in family  How many per month:  15/30 headache days per month, 5 days per month with pain >5/10, has nausea every time and has sensitivity to light every time., states last bad migraine was 4 years ago.  She also has dizziness with the headaches, describes the dizziness and feeling like she is going to pass out.  Missed days of school/work:   Has had to leave work about twice in the past year due to headache.  Quality:   Starts as bifrontal pressure, progresses to area behind eyes and back of head, sometimes encompasses whole head, gets hot,  10/10.    N&V:  Nausea  Photo/phonophobia:   Photophobia>phonophobia  Aura:   none      Review of Systems   Constitutional: Positive for chills. Negative for fever.   HENT: Negative for congestion, hearing loss, sinus pain and tinnitus.    Eyes: Positive for blurred vision and photophobia. Negative for double vision.        Blurred vision with headaches.   Respiratory: Negative for cough and wheezing.    Cardiovascular: Negative for chest pain and palpitations.   Gastrointestinal: Negative for abdominal pain,  "heartburn, nausea and vomiting.   Genitourinary: Negative for dysuria.   Musculoskeletal: Positive for myalgias.   Skin: Negative for rash.   Neurological: Positive for dizziness and headaches. Negative for speech change, focal weakness, seizures and weakness.   Endo/Heme/Allergies: Bruises/bleeds easily.   Psychiatric/Behavioral: Negative for depression. The patient is nervous/anxious. The patient does not have insomnia.        Past Medical History:   Diagnosis Date   • Allergy     seasonal allergies   • Head ache    • Migraine        Current Outpatient Medications on File Prior to Visit   Medication Sig Dispense Refill   • Cholecalciferol (VITAMIN D3) 1.25 MG (38072 UT) Cap      • Coenzyme Q10 (COQ-10) 200 MG Cap Take 1 Capsule by mouth every bedtime.     • MAGNESIUM GLYCINATE PLUS PO Take 400 mg by mouth every bedtime.       No current facility-administered medications on file prior to visit.             Objective:     /70 (BP Location: Left arm, Patient Position: Sitting, BP Cuff Size: Adult)   Pulse 78   Temp 36.7 °C (98 °F) (Temporal)   Resp 16   Ht 1.575 m (5' 2\")   Wt 77.5 kg (170 lb 13.7 oz)   LMP 06/16/2020   SpO2 98%   BMI 31.25 kg/m²      Physical Exam    General:  Alert, no apparent distress,  Psych:   mood and affect WNL  Eyes:      No papilledema noted  Neuro:   Oriented X 4, speech fluent, naming and memory intact                 cranial nerves II-XII intact                 Strength 5/5 BUE/BLE, no drift                  Sensation to PP equal bilaterally                 No limb ataxia with finger to nose and heel to shin                 Ambulates with steady gait.                Biceps,brachioradialis, tricep, patellar and ankle reflex all 2+    Cardiovascular:    S1S2, no abnormal rhythm auscultated, no peripheral edema  Neck:                     No carotid bruits noted   Pulmonary:            Respirations easy, lungs clear to auscultation all fields.    Skin:                     No " obvious rashes.            Assessment/Plan:       1. Migraine without aura and without status migrainosus, not intractable    She is not on birth control, uses condoms, would avoid the prescription preventatives at this time due to teratogenic side effects, she just started Mag and CoQ10, will give it a while longer and add the B2.   Will check MRI.  Consider SSRI if no improvement .    Did not respond well to sumatriptan, will try rizatriptan     MRI brain w/o    Follow up in 2 months

## 2020-07-08 ENCOUNTER — HOSPITAL ENCOUNTER (OUTPATIENT)
Dept: RADIOLOGY | Facility: MEDICAL CENTER | Age: 30
End: 2020-07-08
Attending: NURSE PRACTITIONER
Payer: COMMERCIAL

## 2020-07-08 PROCEDURE — 70551 MRI BRAIN STEM W/O DYE: CPT

## 2020-08-07 ENCOUNTER — TELEMEDICINE (OUTPATIENT)
Dept: MEDICAL GROUP | Facility: IMAGING CENTER | Age: 30
End: 2020-08-07
Payer: COMMERCIAL

## 2020-08-07 VITALS — WEIGHT: 168 LBS | HEIGHT: 62 IN | BODY MASS INDEX: 30.91 KG/M2

## 2020-08-07 DIAGNOSIS — F41.9 ANXIETY: ICD-10-CM

## 2020-08-07 DIAGNOSIS — G43.009 MIGRAINE WITHOUT AURA AND WITHOUT STATUS MIGRAINOSUS, NOT INTRACTABLE: ICD-10-CM

## 2020-08-07 PROCEDURE — 99213 OFFICE O/P EST LOW 20 MIN: CPT | Mod: 95,CR | Performed by: NURSE PRACTITIONER

## 2020-08-07 ASSESSMENT — ANXIETY QUESTIONNAIRES
5. BEING SO RESTLESS THAT IT IS HARD TO SIT STILL: NOT AT ALL
3. WORRYING TOO MUCH ABOUT DIFFERENT THINGS: NOT AT ALL
2. NOT BEING ABLE TO STOP OR CONTROL WORRYING: NOT AT ALL
4. TROUBLE RELAXING: NOT AT ALL
7. FEELING AFRAID AS IF SOMETHING AWFUL MIGHT HAPPEN: NOT AT ALL
1. FEELING NERVOUS, ANXIOUS, OR ON EDGE: SEVERAL DAYS
GAD7 TOTAL SCORE: 1
6. BECOMING EASILY ANNOYED OR IRRITABLE: NOT AT ALL

## 2020-08-07 ASSESSMENT — FIBROSIS 4 INDEX: FIB4 SCORE: 0.42

## 2020-08-07 ASSESSMENT — PATIENT HEALTH QUESTIONNAIRE - PHQ9: CLINICAL INTERPRETATION OF PHQ2 SCORE: 0

## 2020-08-07 ASSESSMENT — PAIN SCALES - GENERAL: PAINLEVEL: NO PAIN

## 2020-08-07 NOTE — PROGRESS NOTES
Telemedicine Visit: Established Patient   This evaluation was conducted via Zoom, using secure and encrypted videoconferencing technology.  The patient was physical located at Home in [place], [place] and the physician was located in [facility].  The patient was presented by self, at home.  The patient's identity was confirmed and verbal consent for the telemedicine encounter was obtained.  Patient is aware that this is a billable encounter.    Subjective:   CC:   Chief Complaint   Patient presents with   • Anxiety     improving; has seen a counselor       Cata Nieves Becerra is a 29 y.o. female presenting for evaluation and management of:    Migraines:  States this is an improving condition. States she saw the neurologist in 7/2020. States she has completed an MRI with no concerns at this time. States she saw opthalmologist and examine was normal. States previously noted nystagmus is not concerning at this time. States that she was prescribed rizatriptan due to intolerance of sumatriptan. States she has tried the rizatriptan twice with minimal improvement of migraine with taking. Denies repeating medication after initial dosage. States with use of preventative supplements: B complex, Magnesium 400 mg, prenatal vitamin, and COQ10 200 mg her migraines have decreased in frequency. States she has focused on improving hydration since last visit. States overall she feels is improving with preventative supplements.    7/8/2020 5:27 PM  HISTORY/REASON FOR EXAM:  Headache, chronic, with new features.  Migraine headaches for 6 months. Worsening for 2 months.     TECHNIQUE/EXAM DESCRIPTION:  MRI of the brain without contrast.     T1 sagittal, T2 fast spin-echo axial, T1 coronal, FLAIR coronal, Diffusion weighted and Apparent Diffusion Coefficient (ADC map) axial images were obtained of the whole brain. Additional FLAIR axial images were obtained.     The study was performed on a Roberto 1.5 Tanya MRI scanner.     COMPARISON:   None.     FINDINGS:  The calvariae are unremarkable.  There are no extra-axial fluid collections.     The ventricular system and basal cisterns are within normal limits.     There are no areas of abnormal signal in the brain substance.     There are no mass effects or shift of midline structures.  There are no hemorrhagic lesions.  The diffusion weighted axial images show no evidence of acute cerebral infarction.     The brainstem and posterior fossa structures are notable for borderline low-lying cerebellar tonsils which extend about 4 mm below the foramen magnum. This does not quite satisfy criteria for Chiari I malformation.     Vascular flow voids in the vertebrobasilar and carotid arteries, [place], and dural venous sinuses are intact.     The paranasal sinuses in the field of view are unremarkable. Mild septal deviation to the left     The mastoids are clear.     IMPRESSION:  1.  Borderline low-lying cerebellar tonsils which extend about 4 mm below the foramen magnum. This does not satisfy criteria for Chiari I malformation which requires 5 mm.  2.  Remainder of the study is unremarkable.    Anxiety:  States this is an improving condition. States she has started to see a counselor. States she has had 3 visit and is finding it helpful.     TALON-7 Questionnaire  Feeling nervous, anxious, or on edge: Several days  Not being able to sop or control worrying: Not at all  Worrying too much about different things: Not at all  Trouble relaxing: Not at all  Being so restless that it's hard to sit still: Not at all  Becoming easily annoyed or irritable: Not at all  Feeling afraid as if something awful might happen: Not at all  Total: 1    Interpretation of TALON 7 Total Score   Score Severity :  0-4 No Anxiety   5-9 Mild Anxiety  10-14 Moderate Anxiety  15-21 Severe Anxiety    ROS   Constitutional: Denies fever, chills, night sweats, weight loss/gain or malaise/fatigue.   HENT: Denies nasal congestion, sore throat,  hearing loss, enlarged thyroid, or difficulty swallowing.    Eyes: Denies changes in vision, pain. Denies  wear corrective wear.   Respiratory: Denies cough, SOB at rest or activity.    Cardiovascular: Denies tachycardia, chest pain, palpitations, or  leg swelling.   Gastrointestinal: Denies N/V/C/D, abdominal pain, loss appetite, reflux, or hematochezia.  Genitourinary: Denies difficulty voiding, dysuria, nocturia, or hematuria.   Skin: Negative for rash or worrisome moles.   Neurological: Negative for focal weakness. History of migraines with dizziness, see HPI.  Endo/Heme/Allergies: Denies bruise/bleed easily. Seasonal allergies.   Psychiatric/Behavioral: Denies depression difficulty sleeping. Anxiety, see HPI.    No Known Allergies    Current medicines (including changes today)  Current Outpatient Medications   Medication Sig Dispense Refill   • Prenatal Vit-Fe Fumarate-FA (PRENATAL PO) Take  by mouth.     • Cholecalciferol (VITAMIN D3) 1.25 MG (66039 UT) Cap      • Coenzyme Q10 (COQ-10) 200 MG Cap Take 1 Capsule by mouth every bedtime.     • MAGNESIUM GLYCINATE PLUS PO Take 400 mg by mouth every bedtime.     • rizatriptan (MAXALT) 10 MG tablet Take 1/2-1 tablet po at onset of headache, may repeat dose in 2 hours if unrelieved.  Do not exceed more than 2 tablets in 24 hours. 9 Tab 3     No current facility-administered medications for this visit.      Patient Active Problem List    Diagnosis Date Noted   • Chronic nonintractable headache 07/07/2020   • Migraine    • Anxiety 10/24/2016     Family History   Problem Relation Age of Onset   • Hypertension Father    • Diabetes Paternal Grandmother    • Diabetes Maternal Grandmother    • Breast Cancer Maternal Grandmother         70's   • No Known Problems Mother    • No Known Problems Sister    • No Known Problems Brother    • No Known Problems Sister    • No Known Problems Brother    • No Known Problems Brother    • No Known Problems Brother    • Heart Disease Neg  "Hx    • Stroke Neg Hx      She  has a past medical history of Allergy, Head ache, and Migraine.  She  has a past surgical history that includes other (2012).     Objective:   Ht 1.575 m (5' 2\")   Wt 76.2 kg (168 lb)   LMP 07/15/2020 (Exact Date)   BMI 30.73 kg/m²   Respiratory rate observed during visit: 16 bpm    Physical Exam:  Constitutional: Alert, no distress, well-groomed.  Skin: No rashes in visible areas.  Eye: Round. Conjunctiva clear, lids normal. No icterus.   ENMT: Lips pink without lesions, good dentition, moist mucous membranes. Phonation normal.  Neck: No masses, no thyromegaly. Moves freely without pain.  CV: Pulse as reported by patient  Respiratory: Unlabored respiratory effort, no cough or audible wheeze  Psych: Alert and oriented x3, normal affect and mood.     Assessment and Plan:      1. Migraine without aura and without status migrainosus, not intractable  This is a chronic stable condition. Instructed to continuing preventative therapy and abortive therapies with patient. Instructed to continuing to take 400 mg of Magnesium daily as tolerated. Aware side effects of Magnesium. Instructed to continue prenatal vitamin for B vitamin supplement. Instructed to continue taking 200 mg of OTC CoQ10 every evening.  Discussed the importance of diet and water intake to decrease severity and frequency of migraines. Encouraged to do neck stretches regularly, and to practice mindfulness and meditation to help relieve and stress triggers. Discussed not taking NSAIDs more than 3 days in row due to rebound head potential when stopped, verbalized understanding. Discussed possible side effects of GI upset and GI bleeding with long-term NSAID use, verbalized understanding. Instructed to take NSAIDs with food. Instructed to follow up with neurologist in 2 months from last visit as discussed in POC of neurology. Discussed repeating Maxalt 10 mg 2 hours after initial dose if still experiencing migraine, " instructed to not exceed 30 mg in 24 hours, verbalized understanding.    2. Anxiety  This is a chronic stable condition. Discussed and encouraged to practice breathing exercises when she identifies her anxiety increasing, verbalized understanding. Discussed the benefit of improving diet and exercise regimen,  verbalized understanding. Discussed benefit of counseling with patient and instructed to continue at this time, verbalized understanding. Discussed HIPPA with patient that her personal and medical information will be protected wherever she is seen, verbalized understanding.  Discussed seeking emergency services if she experiences worse symptoms. Discussed with patient if counseling does not improve anxiety and routine behavior than at future appointment will discuss adjunctive medication, verbalized understanding and agrees to plan of care.    Follow-up: Return in about 2 months (around 10/7/2020).

## 2020-08-20 ENCOUNTER — HOSPITAL ENCOUNTER (OUTPATIENT)
Dept: LAB | Facility: MEDICAL CENTER | Age: 30
End: 2020-08-20
Payer: COMMERCIAL

## 2020-08-20 LAB
BDY FAT % MEASURED: 35.8 %
BP DIAS: 63 MMHG
BP SYS: 96 MMHG
CHOLEST SERPL-MCNC: 150 MG/DL (ref 100–199)
DIABETES HTDIA: NO
EVENT NAME HTEVT: NORMAL
FASTING HTFAS: YES
GLUCOSE SERPL-MCNC: 102 MG/DL (ref 65–99)
HDLC SERPL-MCNC: 64 MG/DL
HYPERTENSION HTHYP: NO
LDLC SERPL CALC-MCNC: 73 MG/DL
SCREENING LOC CITY HTCIT: NORMAL
SCREENING LOC STATE HTSTA: NORMAL
SCREENING LOCATION HTLOC: NORMAL
SMOKING HTSMO: NO
SUBSCRIBER ID HTSID: NORMAL
TRIGL SERPL-MCNC: 64 MG/DL (ref 0–149)

## 2020-08-20 PROCEDURE — 82947 ASSAY GLUCOSE BLOOD QUANT: CPT

## 2020-08-20 PROCEDURE — 36415 COLL VENOUS BLD VENIPUNCTURE: CPT

## 2020-08-20 PROCEDURE — 80061 LIPID PANEL: CPT

## 2020-08-20 PROCEDURE — S5190 WELLNESS ASSESSMENT BY NONPH: HCPCS

## 2020-09-03 ENCOUNTER — OFFICE VISIT (OUTPATIENT)
Dept: NEUROLOGY | Facility: MEDICAL CENTER | Age: 30
End: 2020-09-03
Payer: COMMERCIAL

## 2020-09-03 VITALS
TEMPERATURE: 97.6 F | HEART RATE: 102 BPM | OXYGEN SATURATION: 96 % | DIASTOLIC BLOOD PRESSURE: 66 MMHG | HEIGHT: 62 IN | RESPIRATION RATE: 16 BRPM | WEIGHT: 170.1 LBS | BODY MASS INDEX: 31.3 KG/M2 | SYSTOLIC BLOOD PRESSURE: 108 MMHG

## 2020-09-03 DIAGNOSIS — G89.29 CHRONIC NONINTRACTABLE HEADACHE, UNSPECIFIED HEADACHE TYPE: ICD-10-CM

## 2020-09-03 DIAGNOSIS — R51.9 CHRONIC NONINTRACTABLE HEADACHE, UNSPECIFIED HEADACHE TYPE: ICD-10-CM

## 2020-09-03 PROCEDURE — 99213 OFFICE O/P EST LOW 20 MIN: CPT | Performed by: NURSE PRACTITIONER

## 2020-09-03 ASSESSMENT — ENCOUNTER SYMPTOMS
DOUBLE VISION: 0
DIZZINESS: 1
CONSTIPATION: 0
BLURRED VISION: 0
VOMITING: 0
SHORTNESS OF BREATH: 0
HEADACHES: 1
DEPRESSION: 0
NERVOUS/ANXIOUS: 1
PALPITATIONS: 0
FEVER: 0
MYALGIAS: 0
HEARTBURN: 0
BRUISES/BLEEDS EASILY: 1
DIARRHEA: 0
NAUSEA: 0
CHILLS: 0

## 2020-09-03 ASSESSMENT — FIBROSIS 4 INDEX: FIB4 SCORE: 0.44

## 2020-09-03 NOTE — PROGRESS NOTES
Subjective:      REBECCA Becerra is a 30 y.o. female who presents for follow up with chronic headaches and some migraines.     Since last visit her headaches have decreased from 15 days per month to about 5 days per month, she is taking the supplements but inconsistently.  She tried maxalt x 1, it didn't help.         Onset:  Age 9, since 2014 the headaches have become more frequent and more severe.  Triggers:  Wakes up with them sometimes, alcohol, skipping meals.  Alleviating factors:  rest  Meds tried and result:  Has never been on a preventative, tried Imitrex once made it feel like her neck was disconnected from her body and made her feel groggy.  Hormones:   None  Caffeine use:  1-2 cups of coffee daily  OTC medications--frequency:  Ibuprofen once weekly  Smoking:  None  Alcohol:    Twice monthly 1-2 drinks.  Sleep apnea:  No Dx.  Family Hx:   No headache, migraine or aneurysm history in family  How many per month:  5/30headache days per month,  2/5 with pain over 5/10, she has nausea with all headaches.  She continues to have dizziness with headaches.   Missed days of school/work:   Has had to leave work about twice in the past year due to headache.  Quality:   Starts as bifrontal pressure, progresses to area behind eyes and back of head, sometimes encompasses whole head, gets hot,  10/10.    N&V:  Nausea  Photo/phonophobia:   Photophobia>phonophobia  Aura:   none      Review of Systems   Constitutional: Negative for chills and fever.   HENT: Negative for hearing loss and tinnitus.    Eyes: Negative for blurred vision and double vision.   Respiratory: Negative for shortness of breath.    Cardiovascular: Negative for chest pain and palpitations.   Gastrointestinal: Negative for constipation, diarrhea, heartburn, nausea and vomiting.   Genitourinary: Negative for dysuria.   Musculoskeletal: Negative for myalgias.   Skin: Negative for rash.   Neurological: Positive for dizziness and headaches.  "  Endo/Heme/Allergies: Bruises/bleeds easily.   Psychiatric/Behavioral: Negative for depression. The patient is nervous/anxious.           Objective:     /66   Pulse (!) 102   Temp 36.4 °C (97.6 °F) (Temporal)   Resp 16   Ht 1.575 m (5' 2\")   Wt 77.2 kg (170 lb 1.6 oz)   SpO2 96%   BMI 31.11 kg/m²      Physical Exam      Constitutional:  Alert, no apparent distress,  Psych:   mood and affect WNL  Neuro:  Oriented X 4, speech fluent, naming and memory intact    CN II: Visual fields are full to confrontation. Fundoscopic exam is normal with sharp discs and no vascular changes. Pupils are 3mm and briskly reactive to light.   CN III, IV, VI  EOMs intact, no ptosis  CN V: Facial sensation is intact to pinprick in all 3 divisions bilaterally. Corneal responses are intact.  CN VII: Face is symmetric with normal eye closure and smile.  CN VII: Hearing is normal to rubbing fingers  CN IX, X: Palate elevates symmetrically. Phonation is normal.  CN XI: Head turning and shoulder shrug are intact  CN XII: Tongue is midline with normal movements and no atrophy.              Strength 5/5 BUE/BLE, no drift                 Sensation to PP equal bilaterally                 No limb ataxia with finger to nose and heel to shin                 Ambulates with steady gait.                 Rhomberg negative           Cardiovascular:    S1S2, no abnormal rhythm auscultated, no peripheral edema  Neck:                     No carotid bruits noted   Pulmonary:            Respirations easy, lungs clear to auscultation all fields.     Skin:                     No obvious rashes.       Assessment/Plan:       1. Chronic nonintractable headache, unspecified headache type    Continue supplements, may try rizatriptan again for acute relief, if not helpful will look at alternative abortive therapy.    Follow up in 6 months.          "

## 2020-09-03 NOTE — PATIENT INSTRUCTIONS
Continue magnesium, B2 and coQ10       Migraine Headache  A migraine headache is a very strong throbbing pain on one side or both sides of your head. This type of headache can also cause other symptoms. It can last from 4 hours to 3 days. Talk with your doctor about what things may bring on (trigger) this condition.  What are the causes?  The exact cause of this condition is not known. This condition may be triggered or caused by:  · Drinking alcohol.  · Smoking.  · Taking medicines, such as:  ? Medicine used to treat chest pain (nitroglycerin).  ? Birth control pills.  ? Estrogen.  ? Some blood pressure medicines.  · Eating or drinking certain products.  · Doing physical activity.  Other things that may trigger a migraine headache include:  · Having a menstrual period.  · Pregnancy.  · Hunger.  · Stress.  · Not getting enough sleep or getting too much sleep.  · Weather changes.  · Tiredness (fatigue).  What increases the risk?  · Being 25-55 years old.  · Being female.  · Having a family history of migraine headaches.  · Being .  · Having depression or anxiety.  · Being very overweight.  What are the signs or symptoms?  · A throbbing pain. This pain may:  ? Happen in any area of the head, such as on one side or both sides.  ? Make it hard to do daily activities.  ? Get worse with physical activity.  ? Get worse around bright lights or loud noises.  · Other symptoms may include:  ? Feeling sick to your stomach (nauseous).  ? Vomiting.  ? Dizziness.  ? Being sensitive to bright lights, loud noises, or smells.  · Before you get a migraine headache, you may get warning signs (an aura). An aura may include:  ? Seeing flashing lights or having blind spots.  ? Seeing bright spots, halos, or zigzag lines.  ? Having tunnel vision or blurred vision.  ? Having numbness or a tingling feeling.  ? Having trouble talking.  ? Having weak muscles.  · Some people have symptoms after a migraine headache (postdromal phase),  such as:  ? Tiredness.  ? Trouble thinking (concentrating).  How is this treated?  · Taking medicines that:  ? Relieve pain.  ? Relieve the feeling of being sick to your stomach.  ? Prevent migraine headaches.  · Treatment may also include:  ? Having acupuncture.  ? Avoiding foods that bring on migraine headaches.  ? Learning ways to control your body functions (biofeedback).  ? Therapy to help you know and deal with negative thoughts (cognitive behavioral therapy).  Follow these instructions at home:  Medicines  · Take over-the-counter and prescription medicines only as told by your doctor.  · Ask your doctor if the medicine prescribed to you:  ? Requires you to avoid driving or using heavy machinery.  ? Can cause trouble pooping (constipation). You may need to take these steps to prevent or treat trouble pooping:  § Drink enough fluid to keep your pee (urine) pale yellow.  § Take over-the-counter or prescription medicines.  § Eat foods that are high in fiber. These include beans, whole grains, and fresh fruits and vegetables.  § Limit foods that are high in fat and sugar. These include fried or sweet foods.  Lifestyle  · Do not drink alcohol.  · Do not use any products that contain nicotine or tobacco, such as cigarettes, e-cigarettes, and chewing tobacco. If you need help quitting, ask your doctor.  · Get at least 8 hours of sleep every night.  · Limit and deal with stress.  General instructions         · Keep a journal to find out what may bring on your migraine headaches. For example, write down:  ? What you eat and drink.  ? How much sleep you get.  ? Any change in what you eat or drink.  ? Any change in your medicines.  · If you have a migraine headache:  ? Avoid things that make your symptoms worse, such as bright lights.  ? It may help to lie down in a dark, quiet room.  ? Do not drive or use heavy machinery.  ? Ask your doctor what activities are safe for you.  · Keep all follow-up visits as told by your  doctor. This is important.  Contact a doctor if:  · You get a migraine headache that is different or worse than others you have had.  · You have more than 15 headache days in one month.  Get help right away if:  · Your migraine headache gets very bad.  · Your migraine headache lasts longer than 72 hours.  · You have a fever.  · You have a stiff neck.  · You have trouble seeing.  · Your muscles feel weak or like you cannot control them.  · You start to lose your balance a lot.  · You start to have trouble walking.  · You pass out (faint).  · You have a seizure.  Summary  · A migraine headache is a very strong throbbing pain on one side or both sides of your head. These headaches can also cause other symptoms.  · This condition may be treated with medicines and changes to your lifestyle.  · Keep a journal to find out what may bring on your migraine headaches.  · Contact a doctor if you get a migraine headache that is different or worse than others you have had.  · Contact your doctor if you have more than 15 headache days in a month.  This information is not intended to replace advice given to you by your health care provider. Make sure you discuss any questions you have with your health care provider.  Document Released: 09/26/2009 Document Revised: 04/10/2020 Document Reviewed: 01/30/2020  Elsevier Patient Education © 2020 [employer]

## 2020-09-28 PROCEDURE — 90686 IIV4 VACC NO PRSV 0.5 ML IM: CPT | Performed by: PREVENTIVE MEDICINE

## 2020-09-29 ENCOUNTER — IMMUNIZATION (OUTPATIENT)
Dept: OCCUPATIONAL MEDICINE | Facility: CLINIC | Age: 30
End: 2020-09-29

## 2020-09-29 DIAGNOSIS — Z23 NEED FOR VACCINATION: ICD-10-CM

## 2020-10-21 DIAGNOSIS — F41.9 ANXIETY: ICD-10-CM

## 2020-10-21 NOTE — PROGRESS NOTES
Patient has reached out to PCP and reporting that she is seeing Radha Rubio at [facility] counseling.  States that it has been determined that she is suffering from moderate OCD and would like to consider medication at this time.  She is currently receiving EMRD are treatment.  Nancy Reynolds, APRN-C

## 2020-10-29 ENCOUNTER — HOSPITAL ENCOUNTER (OUTPATIENT)
Dept: LAB | Facility: MEDICAL CENTER | Age: 30
End: 2020-10-29
Attending: PATHOLOGY
Payer: COMMERCIAL

## 2020-10-29 LAB
COVID ORDER STATUS COVID19: NORMAL
SARS-COV-2 RNA RESP QL NAA+PROBE: NOTDETECTED
SPECIMEN SOURCE: NORMAL

## 2020-10-29 PROCEDURE — C9803 HOPD COVID-19 SPEC COLLECT: HCPCS

## 2020-10-29 PROCEDURE — U0003 INFECTIOUS AGENT DETECTION BY NUCLEIC ACID (DNA OR RNA); SEVERE ACUTE RESPIRATORY SYNDROME CORONAVIRUS 2 (SARS-COV-2) (CORONAVIRUS DISEASE [COVID-19]), AMPLIFIED PROBE TECHNIQUE, MAKING USE OF HIGH THROUGHPUT TECHNOLOGIES AS DESCRIBED BY CMS-2020-01-R: HCPCS

## 2020-12-20 ENCOUNTER — IMMUNIZATION (OUTPATIENT)
Dept: FAMILY PLANNING/WOMEN'S HEALTH CLINIC | Facility: IMMUNIZATION CENTER | Age: 30
End: 2020-12-20
Payer: COMMERCIAL

## 2020-12-20 DIAGNOSIS — Z23 NEED FOR VACCINATION: ICD-10-CM

## 2020-12-20 DIAGNOSIS — Z23 ENCOUNTER FOR VACCINATION: Primary | ICD-10-CM

## 2020-12-20 PROCEDURE — 0001A PFIZER SARS-COV-2 VACCINE: CPT

## 2020-12-20 PROCEDURE — 91300 PFIZER SARS-COV-2 VACCINE: CPT

## 2021-01-11 ENCOUNTER — IMMUNIZATION (OUTPATIENT)
Dept: FAMILY PLANNING/WOMEN'S HEALTH CLINIC | Facility: IMMUNIZATION CENTER | Age: 31
End: 2021-01-11
Attending: FAMILY MEDICINE
Payer: COMMERCIAL

## 2021-01-11 DIAGNOSIS — Z23 ENCOUNTER FOR VACCINATION: Primary | ICD-10-CM

## 2021-01-11 PROCEDURE — 91300 PFIZER SARS-COV-2 VACCINE: CPT

## 2021-01-11 PROCEDURE — 0002A PFIZER SARS-COV-2 VACCINE: CPT

## 2021-02-09 ENCOUNTER — PATIENT MESSAGE (OUTPATIENT)
Dept: MEDICAL GROUP | Facility: IMAGING CENTER | Age: 31
End: 2021-02-09

## 2021-03-23 ENCOUNTER — OFFICE VISIT (OUTPATIENT)
Dept: URGENT CARE | Facility: PHYSICIAN GROUP | Age: 31
End: 2021-03-23
Payer: COMMERCIAL

## 2021-03-23 ENCOUNTER — HOSPITAL ENCOUNTER (OUTPATIENT)
Dept: RADIOLOGY | Facility: MEDICAL CENTER | Age: 31
End: 2021-03-23
Attending: PHYSICIAN ASSISTANT
Payer: COMMERCIAL

## 2021-03-23 ENCOUNTER — TELEPHONE (OUTPATIENT)
Dept: MEDICAL GROUP | Facility: IMAGING CENTER | Age: 31
End: 2021-03-23

## 2021-03-23 VITALS
HEART RATE: 91 BPM | DIASTOLIC BLOOD PRESSURE: 58 MMHG | BODY MASS INDEX: 29.44 KG/M2 | SYSTOLIC BLOOD PRESSURE: 102 MMHG | RESPIRATION RATE: 15 BRPM | OXYGEN SATURATION: 96 % | TEMPERATURE: 98.6 F | HEIGHT: 62 IN | WEIGHT: 160 LBS

## 2021-03-23 DIAGNOSIS — R10.31 RLQ ABDOMINAL PAIN: ICD-10-CM

## 2021-03-23 DIAGNOSIS — N83.201 CYST OF RIGHT OVARY: ICD-10-CM

## 2021-03-23 DIAGNOSIS — N20.1 URETEROLITHIASIS: ICD-10-CM

## 2021-03-23 LAB
APPEARANCE UR: CLEAR
BILIRUB UR STRIP-MCNC: NORMAL MG/DL
COLOR UR AUTO: NORMAL
GLUCOSE UR STRIP.AUTO-MCNC: NORMAL MG/DL
INT CON NEG: NEGATIVE
INT CON POS: POSITIVE
KETONES UR STRIP.AUTO-MCNC: NORMAL MG/DL
LEUKOCYTE ESTERASE UR QL STRIP.AUTO: NORMAL
NITRITE UR QL STRIP.AUTO: NORMAL
PH UR STRIP.AUTO: 6.5 [PH] (ref 5–8)
POC URINE PREGNANCY TEST: NORMAL
PROT UR QL STRIP: NORMAL MG/DL
RBC UR QL AUTO: NORMAL
SP GR UR STRIP.AUTO: 1.01
UROBILINOGEN UR STRIP-MCNC: NORMAL MG/DL

## 2021-03-23 PROCEDURE — 81002 URINALYSIS NONAUTO W/O SCOPE: CPT | Performed by: PHYSICIAN ASSISTANT

## 2021-03-23 PROCEDURE — 99214 OFFICE O/P EST MOD 30 MIN: CPT | Performed by: PHYSICIAN ASSISTANT

## 2021-03-23 PROCEDURE — 81025 URINE PREGNANCY TEST: CPT | Performed by: PHYSICIAN ASSISTANT

## 2021-03-23 PROCEDURE — 74176 CT ABD & PELVIS W/O CONTRAST: CPT

## 2021-03-23 ASSESSMENT — ENCOUNTER SYMPTOMS
FEVER: 0
CONSTIPATION: 0
DIZZINESS: 0
DIAPHORESIS: 0
SHORTNESS OF BREATH: 0
DIARRHEA: 0
WEAKNESS: 0
ABDOMINAL PAIN: 1
NAUSEA: 0
PALPITATIONS: 0
HEADACHES: 0
FLANK PAIN: 0
MYALGIAS: 0
COUGH: 0
VOMITING: 0
WEIGHT LOSS: 0
CHILLS: 0

## 2021-03-23 ASSESSMENT — FIBROSIS 4 INDEX: FIB4 SCORE: 0.44

## 2021-03-23 NOTE — PROGRESS NOTES
Subjective:   Cata Becerra is a 30 y.o. female who presents for Abdominal Pain (x 3 days, lower right side, )      HPI:  This is a very pleasant 30-year-old female presenting to the clinic with right lower quadrant abdominal pain x3 days.  Patient states that pain has remained fairly constant.  Currently she rates the pain as a 2/10.  Pain is described as a sharp sensation.  Pain seems to be aggravated after drinking water and when urinating.  Pain starts around the right lower quadrant and occasionally radiates periumbilically and suprapubically.  Patient denies any dysuria, hematuria, flank pain, nausea, vomiting, diarrhea, fevers or chills.  She denies any vaginal discharge or pelvic pain.  She denies any prior history of nephrolithiasis.  She states kidney stones do run in her family.      Review of Systems   Constitutional: Negative for chills, diaphoresis, fever, malaise/fatigue and weight loss.   Respiratory: Negative for cough and shortness of breath.    Cardiovascular: Negative for chest pain and palpitations.   Gastrointestinal: Positive for abdominal pain. Negative for constipation, diarrhea, nausea and vomiting.   Genitourinary: Negative for dysuria, flank pain, frequency, hematuria and urgency.   Musculoskeletal: Negative for myalgias.   Skin: Negative for rash.   Neurological: Negative for dizziness, weakness and headaches.       Medications:    • CoQ-10 Caps  • MAGNESIUM GLYCINATE PLUS PO  • PRENATAL PO  • rizatriptan  • Vitamin D3 Caps    Allergies: Patient has no known allergies.    Problem List: Cata Becerra has Anxiety; Migraine; and Chronic nonintractable headache on their problem list.    Surgical History:  Past Surgical History:   Procedure Laterality Date   • OTHER  2012    removal of wisdom teeth       Past Social Hx: Cata Becerra  reports that she has never smoked. She has never used smokeless tobacco. She reports current alcohol use of about 0.6 oz of alcohol per week. She  "reports that she does not use drugs.     Past Family Hx:  Cata Becerra family history includes Breast Cancer in her maternal grandmother; Diabetes in her maternal grandmother and paternal grandmother; Hypertension in her father; No Known Problems in her brother, brother, brother, brother, mother, sister, and sister.     Problem list, medications, and allergies reviewed by myself today in Epic.     Objective:     /58   Pulse 91   Temp 37 °C (98.6 °F)   Resp 15   Ht 1.575 m (5' 2\")   Wt 72.6 kg (160 lb)   SpO2 96%   BMI 29.26 kg/m²     Physical Exam  Constitutional:       General: She is not in acute distress.     Appearance: Normal appearance. She is not ill-appearing, toxic-appearing or diaphoretic.   HENT:      Head: Normocephalic and atraumatic.   Eyes:      Conjunctiva/sclera: Conjunctivae normal.   Cardiovascular:      Rate and Rhythm: Normal rate and regular rhythm.      Pulses: Normal pulses.      Heart sounds: Normal heart sounds.   Pulmonary:      Effort: Pulmonary effort is normal.      Breath sounds: Normal breath sounds. No wheezing.   Abdominal:      General: Bowel sounds are normal. There is no distension.      Palpations: Abdomen is soft. There is no mass.      Tenderness: There is abdominal tenderness in the right lower quadrant, periumbilical area and suprapubic area. There is no right CVA tenderness, left CVA tenderness, guarding or rebound. Negative signs include Hurtado's sign, Rovsing's sign, McBurney's sign, psoas sign and obturator sign.      Hernia: No hernia is present.       Musculoskeletal:         General: Normal range of motion.      Cervical back: Normal range of motion. No muscular tenderness.   Lymphadenopathy:      Cervical: No cervical adenopathy.   Skin:     General: Skin is warm and dry.      Capillary Refill: Capillary refill takes less than 2 seconds.   Neurological:      General: No focal deficit present.      Mental Status: She is alert and oriented to person, " place, and time. Mental status is at baseline.   Psychiatric:         Mood and Affect: Mood normal.         Thought Content: Thought content normal.       Urine analysis: Trace intact blood all others within normal limits.  Urine hCG: Negative    CT renal colic:  HISTORY/REASON FOR EXAM:  RLQ abdominal pain; RLQ abdominal pain.  Rule out ureterolithiasis versus appendicitis..        TECHNIQUE/EXAM DESCRIPTION AND NUMBER OF VIEWS:  CT scan renal/colic without contrast.     5 mm helical images of the abdomen and pelvis were obtained from the diaphragmatic domes through the pubic symphysis.     Low dose optimization technique was utilized for this CT exam including automated exposure control and adjustment of the mA and/or kV according to patient size.     COMPARISON: None.     FINDINGS:  The visualized lung bases are unremarkable.     The unopacified liver, spleen, adrenal glands, pancreas, and gallbladder are unremarkable.     There is a 1 mm hyperdense focus in the proximal right ureter which may represent a small nonobstructing calculus. No renal calculi. No hydronephrosis or hydroureter.     The bowel is unremarkable. The appendix is normal.     The bladder is unremarkable.     There is a 4 cm right ovarian cyst.     No significant free fluid or adenopathy is identified.     No suspicious bony lesions.     IMPRESSION:     1.  4 cm right ovarian cyst, likely physiologic in a premenopausal female.     2.  Tiny hyperdense focus in the proximal right ureter may represent a small nonobstructing calculus. No hydronephrosis or hydroureter.    Assessment/Plan:     Diagnosis and associated orders:   1. RLQ abdominal pain    - POCT Urinalysis  - POCT Pregnancy  - CT-RENAL COLIC EVALUATION(A/P W/O); Future    2. Ureterolithiasis    3. Cyst of right ovary     Comments/MDM:       • Differential diagnoses and treatment options were discussed with the patient at length today.  Differentials at this time include ureterolithiasis  versus appendicitis versus ovarian cyst versus UTI.  Urinalysis reveals trace intact blood.  On physical exam she was moderately tender to palpation over the right lower quadrant.  Pain is currently mild.  She denies any nausea, vomiting, fevers or chills.  Denies any previous history of nephrolithiasis.  Does have a family history of nephrolithiasis.  We agreed to perform outpatient imaging I will follow-up once results are available.  ER precautions and red flag symptoms discussed.  • Spoke with the patient at 5:30 PM on 3/23/2021 and reviewed her ultrasound findings.  Findings did show a 1 mm nonobstructive stone in the right ureter.  Findings also demonstrated a 4 cm right ovarian cyst.  Patient informs me she has a follow-up appointment with her gynecologist next week.  At this time I recommended increasing fluid intake and alternating Tylenol and ibuprofen as needed for pain.  Appendix was visualized and seemed normal in appearance.  Discussed potential red flag symptoms and signs of infection with the patient today.  She understands if any of these present return to the clinic or nearest emergency department.  Call with any questions or concerns.             Differential diagnosis, natural history, supportive care, and indications for immediate follow-up discussed.    Advised the patient to follow-up with the primary care physician for recheck, reevaluation, and consideration of further management.    Please note that this dictation was created using voice recognition software. I have made reasonable attempt to correct obvious errors, but I expect that there are errors of grammar and possibly content that I did not discover before finalizing the note.    This note was electronically signed by YAMILETH Kwong PA-C

## 2021-03-23 NOTE — TELEPHONE ENCOUNTER
Patient left message, regarding dull pain in her right side when she urinates. Stated it was not currently unbearable. Patient is currently at Urgent Care.

## 2021-03-23 NOTE — TELEPHONE ENCOUNTER
I have an appointment with her on 3/31. We can follow up on that day or she can schedule a follow up appointment sooner if recommended by Urgent care.

## 2021-03-31 ENCOUNTER — OFFICE VISIT (OUTPATIENT)
Dept: MEDICAL GROUP | Facility: IMAGING CENTER | Age: 31
End: 2021-03-31
Payer: COMMERCIAL

## 2021-03-31 VITALS
RESPIRATION RATE: 14 BRPM | WEIGHT: 164 LBS | DIASTOLIC BLOOD PRESSURE: 62 MMHG | TEMPERATURE: 97.5 F | HEIGHT: 62 IN | SYSTOLIC BLOOD PRESSURE: 100 MMHG | OXYGEN SATURATION: 95 % | HEART RATE: 78 BPM | BODY MASS INDEX: 30.18 KG/M2

## 2021-03-31 DIAGNOSIS — Z13.220 SCREENING FOR HYPERLIPIDEMIA: ICD-10-CM

## 2021-03-31 DIAGNOSIS — F41.9 ANXIETY: ICD-10-CM

## 2021-03-31 DIAGNOSIS — R51.9 CHRONIC NONINTRACTABLE HEADACHE, UNSPECIFIED HEADACHE TYPE: ICD-10-CM

## 2021-03-31 DIAGNOSIS — Z87.442 HISTORY OF KIDNEY STONES: ICD-10-CM

## 2021-03-31 DIAGNOSIS — Z13.0 SCREENING FOR DEFICIENCY ANEMIA: ICD-10-CM

## 2021-03-31 DIAGNOSIS — R53.83 FATIGUE, UNSPECIFIED TYPE: ICD-10-CM

## 2021-03-31 DIAGNOSIS — Z13.1 SCREENING FOR DIABETES MELLITUS (DM): ICD-10-CM

## 2021-03-31 DIAGNOSIS — R63.5 WEIGHT GAIN: ICD-10-CM

## 2021-03-31 DIAGNOSIS — G89.29 CHRONIC NONINTRACTABLE HEADACHE, UNSPECIFIED HEADACHE TYPE: ICD-10-CM

## 2021-03-31 PROCEDURE — 99214 OFFICE O/P EST MOD 30 MIN: CPT | Performed by: NURSE PRACTITIONER

## 2021-03-31 ASSESSMENT — PAIN SCALES - GENERAL: PAINLEVEL: NO PAIN

## 2021-03-31 ASSESSMENT — PATIENT HEALTH QUESTIONNAIRE - PHQ9
SUM OF ALL RESPONSES TO PHQ QUESTIONS 1-9: 7
5. POOR APPETITE OR OVEREATING: 1 - SEVERAL DAYS
CLINICAL INTERPRETATION OF PHQ2 SCORE: 2

## 2021-03-31 ASSESSMENT — FIBROSIS 4 INDEX: FIB4 SCORE: 0.44

## 2021-03-31 NOTE — PROGRESS NOTES
Subjective:   CC: ED Follow-Up (03/23/21)    HPI:   Cata presents today to discuss:    Reports she recently was diagnosed with a kidney stone and ovarian cyst that made her seek care at local urgent care. Reports that pain has improved and she is uncertain if she past the kidney stone. Reports she assumed she did due to no having pain anymore. Denies hx of kidney stone, but reports family history of kidney stones. Denies flank pain, blood in urine, fever, body aches, chills, and/or UTI symptoms. States she is experiencing light cramping, but is expecting her menstrual cycle to start today. States she has an appointment with OB/GYN next week for further follow up.    Reports overall her headaches have improved. Reports she continues to have weekly headaches, but they are not as severe. Denies auras with HA. States she feels her HA behind her eyes with intermittent nausea. States she has tried to take Maxalt with minimal improvement. States she has better relief with 400-800 mg of Ibuprofen. Denies any side effects to medication. Denies HA last more than a few hours.    Anxiety  Reports she has continued to see a counselor once a week, Radha Rubio. States she is also seeing a psychiatrist at [facility] once a month. States she has started Zoloft 50 mg daily. Denies any side effects at this time. States she has been diagnosed with OCD and Zoloft is helping her with decreasing her desire to wash her hands constantly. Reports she continues to have daily fatigue.    Depression Screening  Little interest or pleasure in doing things?  1 - several days   Feeling down, depressed , or hopeless? 1 - several days   Trouble falling or staying asleep, or sleeping too much?  1 - several days   Feeling tired or having little energy?  2 - more than half the days   Poor appetite or overeating?  1 - several days   Feeling bad about yourself - or that you are a failure or have let yourself or your family down? 0 - not at all    Trouble concentrating on things, such as reading the newspaper or watching television? 1 - several days   Moving or speaking so slowly that other people could have noticed.  Or the opposite - being so fidgety or restless that you have been moving around a lot more than usual?  0 - not at all   Thoughts that you would be better off dead, or of hurting yourself?  0 - not at all   Patient Health Questionnaire Score: 7     If depressive symptoms identified deferred to follow up visit unless specifically addressed in assesment and plan.    Interpretation of PHQ-9 Total Score   Score Severity   1-4 No Depression   5-9 Mild Depression   10-14 Moderate Depression   15-19 Moderately Severe Depression   20-27 Severe Depression      Past Medical History:   Diagnosis Date   • Allergy     seasonal allergies   • Head ache    • Migraine      Social History     Tobacco Use   • Smoking status: Never Smoker   • Smokeless tobacco: Never Used   Substance Use Topics   • Alcohol use: Yes     Alcohol/week: 0.6 oz     Types: 1 Glasses of wine per week     Comment: socially   • Drug use: No     Current Outpatient Medications Ordered in Epic   Medication Sig Dispense Refill   • sertraline (ZOLOFT) 50 MG Tab Take 50 mg by mouth every morning.     • Prenatal Vit-Fe Fumarate-FA (PRENATAL PO) Take  by mouth.     • Coenzyme Q10 (COQ-10) 200 MG Cap Take 1 Capsule by mouth every bedtime.     • MAGNESIUM GLYCINATE PLUS PO Take 400 mg by mouth every bedtime.     • rizatriptan (MAXALT) 10 MG tablet Take 1/2-1 tablet po at onset of headache, may repeat dose in 2 hours if unrelieved.  Do not exceed more than 2 tablets in 24 hours. (Patient not taking: Reported on 3/23/2021) 9 Tab 3     No current Epic-ordered facility-administered medications on file.     Allergies:  Patient has no known allergies.    Health Maintenance: Completed.    ROS:  Constitutional: Denies fever, chills, night sweats, weight loss/gain or malaise/fatigue.   HENT: Denies nasal  "congestion, sore throat, hearing loss, enlarged thyroid, or difficulty swallowing.    Eyes: Denies changes in vision, pain. Denies  wear corrective wear.   Respiratory: Denies cough, SOB at rest or activity.    Cardiovascular: Denies tachycardia, chest pain, palpitations, or  leg swelling.   Gastrointestinal: Denies N/V/C/D, abdominal pain, loss appetite, reflux, or hematochezia.  Genitourinary: Denies difficulty voiding, dysuria, nocturia, or hematuria. Kidney stones, see HPI.  Skin: Negative for rash or worrisome moles.   Neurological: Negative for focal weakness. History of migraines with dizziness, see HPI.  Endo/Heme/Allergies: Denies bruise/bleed easily. Seasonal allergies.   Psychiatric/Behavioral: Denies depression difficulty sleeping. Anxiety, see HPI.    Objective:   Exam:  /62 (BP Location: Left arm, Patient Position: Sitting, BP Cuff Size: Adult)   Pulse 78   Temp 36.4 °C (97.5 °F) (Temporal)   Resp 14   Ht 1.575 m (5' 2\")   Wt 74.4 kg (164 lb)   LMP 03/05/2021   SpO2 95%   BMI 30.00 kg/m²  Body mass index is 30 kg/m².    General: Normal appearing. No distress.  HEENT: Normocephalic. Eyes conjunctiva clear lids without ptosis, PERRLA, ears normal shape and contour. Oral, ears, and nasal examine deferred due to current COVID-19 outbreak.  Neck: Trachea midline, no masses, no thyromegaly.  Pulmonary: Clear to ausculation.  Normal effort. No rales, ronchi, or wheezing.  Cardiovascular: Regular rate and rhythm without murmur. Pedal and radial pulses are intact and equal bilaterally.  Abdomen: Soft, nontender, nondistended. Normal bowel sounds. Liver and spleen are not palpable. No CVA tenderness.  Neurologic: Grossly non-focal.  Skin: Warm and dry. No obvious lesions.  Musculoskeletal: Normal gait. No extremity cyanosis, clubbing, or edema.   Psych: Normal mood and affect. Alert and oriented x3. Judgment and insight is normal.    Assessment & Plan:   1. Weight gain   Discussed the overall " benefit of a well-balanced diet, regular exercise, and stress management, verbalized understanding. Encouraged accumulation of 150 minutes or more of moderate-intensity activity each week as tolerated. Discussed a healthy diet that is low in fat, sodium, and cholesterol, such as the mediterranean diet that is typically high in fruits, vegetables, whole grains, beans, nuts, and seeds, includes olive oil as an important source of monounsaturated fat, DASH diet, and/or also consider a plant-based diet. Will evaluate plan of care once labs are obtained.    -     TSH WITH REFLEX TO FT4; Future    2. Screening for diabetes mellitus (DM)  3. Screening for deficiency anemia  4. Screening for hyperlipidemia  Discussed preventive screening labs with patient, verbalized understanding. Will evaluate plan of care once labs are obtained    -     Lipid Profile; Future        -     Comp Metabolic Panel; Future        -     CBC WITH DIFFERENTIAL; Future    5. Fatigue, unspecified type  This is a chronic stable condition. Will evaluate plan of care once labs are obtained.    -     VITAMIN D,25 HYDROXY; Future  -     TSH WITH REFLEX TO FT4; Future    6. Chronic nonintractable headache, unspecified headache type  This is a chronic stable condition. Reinforced preventive measures and taking abortive medication as tolerated.     7. Anxiety  This is a chronic stable condition. Instructed to continue current medication regimen and care with established psychiatry and counselor.     8. History of kidney stones  This is a stable condition. Reviewed signs and symptoms of kidney stones and complications with patient. Instructed to continue drinking water regularly and avoid drinking soda, verbalized understanding.  Discussed seeking emergency services if she experiences worse symptoms. Discussed keeping OB/GYN appointment for further evaluation.    Return in about 6 months (around 9/30/2021) for or sooner pending labs.    Please note that this  dictation was created using voice recognition software. I have made every reasonable attempt to correct obvious errors, but I expect that there are errors of grammar and possibly content that I did not discover before finalizing the note.

## 2021-04-04 PROBLEM — Z87.442 HISTORY OF KIDNEY STONES: Status: ACTIVE | Noted: 2021-04-04

## 2021-04-04 NOTE — ASSESSMENT & PLAN NOTE
Reports she has continued to see a counselor once a week, Radha Rubio. States she is also seeing a psychiatrist at [facility] once a month. States she has started Zoloft 50 mg daily. Denies any side effects at this time. States she has been diagnosed with OCD and Zoloft is helping her with decreasing her desire to wash her hands constantly. Reports she continues to have daily fatigue.    Depression Screening  Little interest or pleasure in doing things?  1 - several days   Feeling down, depressed , or hopeless? 1 - several days   Trouble falling or staying asleep, or sleeping too much?  1 - several days   Feeling tired or having little energy?  2 - more than half the days   Poor appetite or overeating?  1 - several days   Feeling bad about yourself - or that you are a failure or have let yourself or your family down? 0 - not at all   Trouble concentrating on things, such as reading the newspaper or watching television? 1 - several days   Moving or speaking so slowly that other people could have noticed.  Or the opposite - being so fidgety or restless that you have been moving around a lot more than usual?  0 - not at all   Thoughts that you would be better off dead, or of hurting yourself?  0 - not at all   Patient Health Questionnaire Score: 7     If depressive symptoms identified deferred to follow up visit unless specifically addressed in assesment and plan.    Interpretation of PHQ-9 Total Score   Score Severity   1-4 No Depression   5-9 Mild Depression   10-14 Moderate Depression   15-19 Moderately Severe Depression   20-27 Severe Depression

## 2021-04-05 ENCOUNTER — GYNECOLOGY VISIT (OUTPATIENT)
Dept: OBGYN | Facility: CLINIC | Age: 31
End: 2021-04-05
Payer: COMMERCIAL

## 2021-04-05 VITALS — SYSTOLIC BLOOD PRESSURE: 95 MMHG | BODY MASS INDEX: 30.18 KG/M2 | WEIGHT: 165 LBS | DIASTOLIC BLOOD PRESSURE: 77 MMHG

## 2021-04-05 DIAGNOSIS — R10.2 PELVIC PAIN: ICD-10-CM

## 2021-04-05 DIAGNOSIS — Z91.89 AT RISK FOR FERTILITY PROBLEMS: ICD-10-CM

## 2021-04-05 DIAGNOSIS — N94.6 DYSMENORRHEA: ICD-10-CM

## 2021-04-05 PROCEDURE — 99214 OFFICE O/P EST MOD 30 MIN: CPT | Performed by: OBSTETRICS & GYNECOLOGY

## 2021-04-05 RX ORDER — IBUPROFEN 800 MG/1
800 TABLET ORAL EVERY 8 HOURS PRN
Qty: 30 TABLET | Refills: 3 | Status: SHIPPED | OUTPATIENT
Start: 2021-04-05

## 2021-04-05 ASSESSMENT — FIBROSIS 4 INDEX: FIB4 SCORE: 0.44

## 2021-04-05 NOTE — NON-PROVIDER
Pt here to discuss ovarian cyst/RLQ pain  Pt states that she has been having really really bad cramping during periods and sometimes cramp throughout the whole month  Good # [phone]  Pharmacy verified.

## 2021-04-05 NOTE — PROGRESS NOTES
30 y.o.  female previously seen for : Chief Complaint:  Cramps , irregular cycles , attempting pregnancy     Specialty Problems     None      . Patient now here in follow up. Last seen 2019 , now with painful cramps , monthly menses . Desires pregnancy , not often sexually active , but in monogamus relationship .   Patient's last menstrual period was 2021.      Subjective: Abdominal Pain: positive    Vaginal Bleedingnegative  Menstrual Cycle: normal: positive  Dysmenorrhea:positive - through out month :   Dyspareunia:positive - every time   Urinary Symptoms: negative   Vaginal Discharge:{No          Current Outpatient Medications:   •  sertraline (ZOLOFT) 50 MG Tab, Take 50 mg by mouth every morning., Disp: , Rfl:   •  Prenatal Vit-Fe Fumarate-FA (PRENATAL PO), Take  by mouth., Disp: , Rfl:   •  Coenzyme Q10 (COQ-10) 200 MG Cap, Take 1 Capsule by mouth every bedtime., Disp: , Rfl:   •  MAGNESIUM GLYCINATE PLUS PO, Take 400 mg by mouth every bedtime., Disp: , Rfl:   •  rizatriptan (MAXALT) 10 MG tablet, Take 1/2-1 tablet po at onset of headache, may repeat dose in 2 hours if unrelieved.  Do not exceed more than 2 tablets in 24 hours. (Patient not taking: Reported on 3/23/2021), Disp: 9 Tab, Rfl: 3  ROS: no change in ROS since visit of : Visit date not found.  :  Recent Results (from the past 336 hour(s))   POCT Urinalysis    Collection Time: 21  4:09 PM   Result Value Ref Range    POC Color Straw Negative    POC Appearance Clear Negative    POC Leukocyte Esterase Neg Negative    POC Nitrites Neg Negative    POC Urobiligen Neg Negative (0.2) mg/dL    POC Protein Neg Negative mg/dL    POC Urine PH 6.5 5.0 - 8.0    POC Blood Trace Negative    POC Specific Gravity 1.010 <1.005 - >1.030    POC Ketones Neg Negative mg/dL    POC Bilirubin Neg Negative mg/dL    POC Glucose Neg Negative mg/dL   POCT Pregnancy    Collection Time: 21  4:10 PM   Result Value Ref Range    POC Urine Pregnancy Test Neg  Negative    Internal Control Positive Positive     Internal Control Negative Negative        Vitals:    04/05/21 0808   BP: (!) 95/77   Weight: 74.8 kg (165 lb)     Past Medical History:   Diagnosis Date   • Allergy     seasonal allergies   • Head ache    • Migraine      PGYN: None  Social History     Socioeconomic History   • Marital status:      Spouse name: Not on file   • Number of children: Not on file   • Years of education: Not on file   • Highest education level: Not on file   Occupational History   • Not on file   Tobacco Use   • Smoking status: Never Smoker   • Smokeless tobacco: Never Used   Substance and Sexual Activity   • Alcohol use: Yes     Alcohol/week: 0.6 oz     Types: 1 Glasses of wine per week     Comment: socially   • Drug use: No   • Sexual activity: Yes     Partners: Male     Birth control/protection: Rhythm   Other Topics Concern   • Not on file   Social History Narrative   • Not on file     Social Determinants of Health     Financial Resource Strain:    • Difficulty of Paying Living Expenses:    Food Insecurity:    • Worried About Running Out of Food in the Last Year:    • Ran Out of Food in the Last Year:    Transportation Needs:    • Lack of Transportation (Medical):    • Lack of Transportation (Non-Medical):    Physical Activity:    • Days of Exercise per Week:    • Minutes of Exercise per Session:    Stress:    • Feeling of Stress :    Social Connections:    • Frequency of Communication with Friends and Family:    • Frequency of Social Gatherings with Friends and Family:    • Attends Anabaptism Services:    • Active Member of Clubs or Organizations:    • Attends Club or Organization Meetings:    • Marital Status:    Intimate Partner Violence:    • Fear of Current or Ex-Partner:    • Emotionally Abused:    • Physically Abused:    • Sexually Abused:      Family History   Problem Relation Age of Onset   • Hypertension Father    • Diabetes Paternal Grandmother    • Diabetes Maternal  Grandmother    • Breast Cancer Maternal Grandmother         70's   • No Known Problems Mother    • No Known Problems Sister    • No Known Problems Brother    • No Known Problems Sister    • No Known Problems Brother    • No Known Problems Brother    • No Known Problems Brother    • Heart Disease Neg Hx    • Stroke Neg Hx      Past Surgical History:   Procedure Laterality Date   • OTHER  2012    removal of wisdom teeth         Exam:   General : Awake, alert and oriented x 3  Abdominal : no masses, nontender, soft, non-distended no rebound or guarding  Pelvic :  external genitalia normal, Bartholin's glands, urethra, [place]'s glands negative, vaginal mucosa normal, cervix clear;  Uterus mobile , non tender   Pelvic Support system : normal      Ass: Dysmenorrhea          Pelvic pain           Dyspareunia   Spent 25 minutes with the patient ; Face to Face, with >50% of this time spent in counseling and coordination of care, surrounding the above mentioned issues as well as:Discuss work up for above , with /without desire for  Pregnancy   P. Motrin 800 mg       Luteal p-4      Discuss etioogies /questions answered , including endometriosis     Follow up : Return visit in 4 week(s)

## 2021-04-21 ENCOUNTER — HOSPITAL ENCOUNTER (OUTPATIENT)
Dept: LAB | Facility: MEDICAL CENTER | Age: 31
End: 2021-04-21
Attending: NURSE PRACTITIONER
Payer: COMMERCIAL

## 2021-04-21 ENCOUNTER — HOSPITAL ENCOUNTER (OUTPATIENT)
Dept: LAB | Facility: MEDICAL CENTER | Age: 31
End: 2021-04-21
Attending: OBSTETRICS & GYNECOLOGY
Payer: COMMERCIAL

## 2021-04-21 DIAGNOSIS — Z13.220 SCREENING FOR HYPERLIPIDEMIA: ICD-10-CM

## 2021-04-21 DIAGNOSIS — R53.83 FATIGUE, UNSPECIFIED TYPE: ICD-10-CM

## 2021-04-21 DIAGNOSIS — Z91.89 AT RISK FOR FERTILITY PROBLEMS: ICD-10-CM

## 2021-04-21 DIAGNOSIS — R63.5 WEIGHT GAIN: ICD-10-CM

## 2021-04-21 DIAGNOSIS — R10.2 PELVIC PAIN: ICD-10-CM

## 2021-04-21 DIAGNOSIS — N94.6 DYSMENORRHEA: ICD-10-CM

## 2021-04-21 DIAGNOSIS — Z13.1 SCREENING FOR DIABETES MELLITUS (DM): ICD-10-CM

## 2021-04-21 DIAGNOSIS — Z13.0 SCREENING FOR DEFICIENCY ANEMIA: ICD-10-CM

## 2021-04-21 LAB
BASOPHILS # BLD AUTO: 0.6 % (ref 0–1.8)
BASOPHILS # BLD: 0.03 K/UL (ref 0–0.12)
EOSINOPHIL # BLD AUTO: 0.1 K/UL (ref 0–0.51)
EOSINOPHIL NFR BLD: 1.9 % (ref 0–6.9)
ERYTHROCYTE [DISTWIDTH] IN BLOOD BY AUTOMATED COUNT: 41.3 FL (ref 35.9–50)
HCT VFR BLD AUTO: 43.3 % (ref 37–47)
HGB BLD-MCNC: 14.4 G/DL (ref 12–16)
IMM GRANULOCYTES # BLD AUTO: 0.02 K/UL (ref 0–0.11)
IMM GRANULOCYTES NFR BLD AUTO: 0.4 % (ref 0–0.9)
LYMPHOCYTES # BLD AUTO: 1.32 K/UL (ref 1–4.8)
LYMPHOCYTES NFR BLD: 24.5 % (ref 22–41)
MCH RBC QN AUTO: 31.3 PG (ref 27–33)
MCHC RBC AUTO-ENTMCNC: 33.3 G/DL (ref 33.6–35)
MCV RBC AUTO: 94.1 FL (ref 81.4–97.8)
MONOCYTES # BLD AUTO: 0.47 K/UL (ref 0–0.85)
MONOCYTES NFR BLD AUTO: 8.7 % (ref 0–13.4)
NEUTROPHILS # BLD AUTO: 3.44 K/UL (ref 2–7.15)
NEUTROPHILS NFR BLD: 63.9 % (ref 44–72)
NRBC # BLD AUTO: 0 K/UL
NRBC BLD-RTO: 0 /100 WBC
PLATELET # BLD AUTO: 279 K/UL (ref 164–446)
PMV BLD AUTO: 10.2 FL (ref 9–12.9)
PROGEST SERPL-MCNC: 7.14 NG/ML
RBC # BLD AUTO: 4.6 M/UL (ref 4.2–5.4)
WBC # BLD AUTO: 5.4 K/UL (ref 4.8–10.8)

## 2021-04-21 PROCEDURE — 85025 COMPLETE CBC W/AUTO DIFF WBC: CPT

## 2021-04-21 PROCEDURE — 84443 ASSAY THYROID STIM HORMONE: CPT

## 2021-04-21 PROCEDURE — 80061 LIPID PANEL: CPT

## 2021-04-21 PROCEDURE — 80053 COMPREHEN METABOLIC PANEL: CPT

## 2021-04-21 PROCEDURE — 82306 VITAMIN D 25 HYDROXY: CPT

## 2021-04-21 PROCEDURE — 36415 COLL VENOUS BLD VENIPUNCTURE: CPT

## 2021-04-21 PROCEDURE — 84144 ASSAY OF PROGESTERONE: CPT

## 2021-04-22 LAB
ALBUMIN SERPL BCP-MCNC: 4.4 G/DL (ref 3.2–4.9)
ALBUMIN/GLOB SERPL: 1.6 G/DL
ALP SERPL-CCNC: 74 U/L (ref 30–99)
ALT SERPL-CCNC: 69 U/L (ref 2–50)
ANION GAP SERPL CALC-SCNC: 7 MMOL/L (ref 7–16)
AST SERPL-CCNC: 146 U/L (ref 12–45)
BILIRUB SERPL-MCNC: 0.5 MG/DL (ref 0.1–1.5)
BUN SERPL-MCNC: 10 MG/DL (ref 8–22)
CALCIUM SERPL-MCNC: 8.8 MG/DL (ref 8.5–10.5)
CHLORIDE SERPL-SCNC: 108 MMOL/L (ref 96–112)
CHOLEST SERPL-MCNC: 137 MG/DL (ref 100–199)
CO2 SERPL-SCNC: 24 MMOL/L (ref 20–33)
CREAT SERPL-MCNC: 0.52 MG/DL (ref 0.5–1.4)
GLOBULIN SER CALC-MCNC: 2.8 G/DL (ref 1.9–3.5)
GLUCOSE SERPL-MCNC: 100 MG/DL (ref 65–99)
HDLC SERPL-MCNC: 55 MG/DL
LDLC SERPL CALC-MCNC: 70 MG/DL
POTASSIUM SERPL-SCNC: 4.4 MMOL/L (ref 3.6–5.5)
PROT SERPL-MCNC: 7.2 G/DL (ref 6–8.2)
SODIUM SERPL-SCNC: 139 MMOL/L (ref 135–145)
TRIGL SERPL-MCNC: 60 MG/DL (ref 0–149)
TSH SERPL DL<=0.005 MIU/L-ACNC: 1.28 UIU/ML (ref 0.38–5.33)

## 2021-04-23 LAB — 25(OH)D3 SERPL-MCNC: 14 NG/ML (ref 30–80)

## 2021-04-26 ENCOUNTER — GYNECOLOGY VISIT (OUTPATIENT)
Dept: OBGYN | Facility: CLINIC | Age: 31
End: 2021-04-26
Payer: COMMERCIAL

## 2021-04-26 ENCOUNTER — OFFICE VISIT (OUTPATIENT)
Dept: MEDICAL GROUP | Facility: IMAGING CENTER | Age: 31
End: 2021-04-26
Payer: COMMERCIAL

## 2021-04-26 VITALS
SYSTOLIC BLOOD PRESSURE: 104 MMHG | RESPIRATION RATE: 16 BRPM | HEIGHT: 62 IN | DIASTOLIC BLOOD PRESSURE: 76 MMHG | TEMPERATURE: 98.1 F | HEART RATE: 68 BPM | OXYGEN SATURATION: 97 % | BODY MASS INDEX: 29.81 KG/M2 | WEIGHT: 162 LBS

## 2021-04-26 VITALS — WEIGHT: 162 LBS | BODY MASS INDEX: 29.63 KG/M2 | SYSTOLIC BLOOD PRESSURE: 109 MMHG | DIASTOLIC BLOOD PRESSURE: 72 MMHG

## 2021-04-26 DIAGNOSIS — F41.9 ANXIETY: ICD-10-CM

## 2021-04-26 DIAGNOSIS — E55.9 VITAMIN D DEFICIENCY: ICD-10-CM

## 2021-04-26 DIAGNOSIS — Z91.89 AT RISK FOR FERTILITY PROBLEMS: ICD-10-CM

## 2021-04-26 DIAGNOSIS — R73.01 ELEVATED FASTING GLUCOSE: ICD-10-CM

## 2021-04-26 DIAGNOSIS — R79.89 ELEVATED LIVER FUNCTION TESTS: ICD-10-CM

## 2021-04-26 LAB
HBA1C MFR BLD: 5.1 % (ref 0–5.6)
INT CON NEG: NEGATIVE
INT CON POS: POSITIVE

## 2021-04-26 PROCEDURE — 83036 HEMOGLOBIN GLYCOSYLATED A1C: CPT | Performed by: NURSE PRACTITIONER

## 2021-04-26 PROCEDURE — 99214 OFFICE O/P EST MOD 30 MIN: CPT | Performed by: OBSTETRICS & GYNECOLOGY

## 2021-04-26 PROCEDURE — 99214 OFFICE O/P EST MOD 30 MIN: CPT | Performed by: NURSE PRACTITIONER

## 2021-04-26 ASSESSMENT — ANXIETY QUESTIONNAIRES
GAD7 TOTAL SCORE: 4
1. FEELING NERVOUS, ANXIOUS, OR ON EDGE: SEVERAL DAYS
7. FEELING AFRAID AS IF SOMETHING AWFUL MIGHT HAPPEN: NOT AT ALL
3. WORRYING TOO MUCH ABOUT DIFFERENT THINGS: SEVERAL DAYS
2. NOT BEING ABLE TO STOP OR CONTROL WORRYING: SEVERAL DAYS
5. BEING SO RESTLESS THAT IT IS HARD TO SIT STILL: NOT AT ALL
6. BECOMING EASILY ANNOYED OR IRRITABLE: SEVERAL DAYS
4. TROUBLE RELAXING: NOT AT ALL

## 2021-04-26 ASSESSMENT — PATIENT HEALTH QUESTIONNAIRE - PHQ9: CLINICAL INTERPRETATION OF PHQ2 SCORE: 0

## 2021-04-26 ASSESSMENT — FIBROSIS 4 INDEX
FIB4 SCORE: 1.83
FIB4 SCORE: 1.83

## 2021-04-26 NOTE — PROGRESS NOTES
Subjective:   CC: Lab Results    HPI:   Cata presents today to discuss:    Anxiety  Reports that she continues to seek counseling regularly.  States that she has started Zoloft 50 mg about a month ago.  Denies any side effects to medication at this time.  States that she has noticed a improvement in her overall mood.  States that she is currently working on becoming more physically active.  States that she is exercising and attempting to meal plan.  Reports that she continues to experience ongoing fatigue.    Depression Screening  Little interest or pleasure in doing things?  0 - not at all  Feeling down, depressed , or hopeless? 0 - not at all  Patient Health Questionnaire Score: 0    If depressive symptoms identified deferred to follow up visit unless specifically addressed in assesment and plan.    Interpretation of PHQ-9 Total Score   Score Severity   1-4 Minimal Depression   5-9 Mild Depression   10-14 Moderate Depression   15-19 Moderately Severe Depression   20-27 Severe Depression    TALON-7 Questionnaire  Feeling nervous, anxious, or on edge: Several days  Not being able to sop or control worrying: Several days  Worrying too much about different things: Several days  Trouble relaxing: Not at all  Being so restless that it's hard to sit still: Not at all  Becoming easily annoyed or irritable: Several days  Feeling afraid as if something awful might happen: Not at all  Total: 4    Interpretation of TALON 7 Total Score   Score Severity :  0-4 No Anxiety   5-9 Mild Anxiety  10-14 Moderate Anxiety  15-21 Severe Anxiety    3/31/2021 previous PHQ: 7    Vitamin D deficiency  Reports ongoing fatigue.  Denies any history of vitamin D deficiency.  States that she is taking a prenatal vitamin at this time, but no other vitamin D supplement.    4/21/2021 vitamin D level: 14    Denies any regular alcohol intake and/or any Tylenol intake.  States that she will take Tylenol every 1 to 3 weeks for migraine pain.  States that  she only drinks socially and when she does drink it is 1 to 3 glasses of wine.  Denies daily drinking at this time.    4/20/2021 CMP:  Ref Range & Units 6 d ago 8 mo ago 1 yr ago    Sodium 135 - 145 mmol/L 139   139    Potassium 3.6 - 5.5 mmol/L 4.4   4.7    Chloride 96 - 112 mmol/L 108   106    Co2 20 - 33 mmol/L 24   26    Anion Gap 7.0 - 16.0 7.0   7.0 R    Glucose 65 - 99 mg/dL 100High   102High   94    Bun 8 - 22 mg/dL 10   18    Creatinine 0.50 - 1.40 mg/dL 0.52   0.66    Calcium 8.5 - 10.5 mg/dL 8.8   9.2    AST(SGOT) 12 - 45 U/L 146High    14    ALT(SGPT) 2 - 50 U/L 69High    11    Alkaline Phosphatase 30 - 99 U/L 74   57    Total Bilirubin 0.1 - 1.5 mg/dL 0.5   0.5    Albumin 3.2 - 4.9 g/dL 4.4   4.5    Total Protein 6.0 - 8.2 g/dL 7.2   7.5    Globulin 1.9 - 3.5 g/dL 2.8   3.0    A-G Ratio g/dL 1.6   1.5     See anxiety HPI for physical activity level and diet.    4/21/2021 fasting glucose: 100    Past Medical History:   Diagnosis Date   • Allergy     seasonal allergies   • Head ache    • Migraine      Social History     Tobacco Use   • Smoking status: Never Smoker   • Smokeless tobacco: Never Used   Substance Use Topics   • Alcohol use: Yes     Alcohol/week: 0.6 oz     Types: 1 Glasses of wine per week     Comment: socially   • Drug use: No     Current Outpatient Medications Ordered in Epic   Medication Sig Dispense Refill   • vitamin D, Ergocalciferol, (DRISDOL) 1.25 MG (17779 UT) Cap capsule Take 1 capsule by mouth every 7 days. 12 capsule 0   • ibuprofen (MOTRIN) 800 MG Tab Take 1 tablet by mouth every 8 hours as needed for Moderate Pain. 30 tablet 3   • sertraline (ZOLOFT) 50 MG Tab Take 50 mg by mouth every morning.     • Prenatal Vit-Fe Fumarate-FA (PRENATAL PO) Take  by mouth.     • Coenzyme Q10 (COQ-10) 200 MG Cap Take 1 Capsule by mouth every bedtime.     • MAGNESIUM GLYCINATE PLUS PO Take 400 mg by mouth every bedtime.       No current Epic-ordered facility-administered medications on file.  "    Allergies:  Patient has no known allergies.    Health Maintenance: Completed.    ROS:  Constitutional: Denies fever, chills, night sweats, weight loss/gain or malaise/fatigue.   HENT: Denies nasal congestion, sore throat, hearing loss, enlarged thyroid, or difficulty swallowing.    Eyes: Denies changes in vision, pain. Denies wearing corrective wear.   Respiratory: Denies cough, SOB at rest or activity.    Cardiovascular: Denies tachycardia, chest pain, palpitations, or  leg swelling.   Gastrointestinal: Denies N/V/C/D, abdominal pain, loss appetite, reflux, or hematochezia.  Genitourinary: Denies difficulty voiding, dysuria, nocturia, or hematuria.   History of kidney stones.  Skin: Negative for rash or worrisome moles.   Neurological: Negative for focal weakness. History of migraines with dizziness.  Endo/Heme/Allergies: Denies bruise/bleed easily. Seasonal allergies.   Psychiatric/Behavioral: Denies depression difficulty sleeping. Anxiety, see HPI.    Lab: POCT A1c: 5.1%  Objective:   Exam:  /76 (BP Location: Left arm, Patient Position: Sitting)   Pulse 68   Temp 36.7 °C (98.1 °F) (Temporal)   Resp 16   Ht 1.575 m (5' 2\")   Wt 73.5 kg (162 lb)   LMP 04/24/2021   SpO2 97%   BMI 29.63 kg/m²  Body mass index is 29.63 kg/m².  General: Normal appearing. No distress.  HEENT: Normocephalic. Eyes conjunctiva clear lids without ptosis, PERRLA, ears normal shape and contour. Oral, ears, and nasal examine deferred due to current COVID-19 outbreak, no acute concerns. Patient wore a mask during visit.  Neck: Trachea midline, no masses, no thyromegaly.  Pulmonary:  Unlabored respiratory effort, no cough.  Neurologic: Grossly non-focal.  Skin: Warm and dry. No obvious lesions.  Musculoskeletal: Normal gait. No extremity cyanosis, clubbing, or edema.   Psych: Normal mood and affect. Alert and oriented x3. Judgment and insight is normal.    Assessment & Plan:   1. Elevated fasting glucose  This is a stable " condition. Reviewed recent labs with patient, verbalized understanding.  Discussed the overall benefit of a well-balanced diet, regular exercise, and stress management, verbalized understanding. Encouraged accumulation of 150 minutes or more of moderate-intensity activity each week as tolerated. Discussed a healthy diet that is low in fat, sodium, and cholesterol, such as the mediterranean diet that is typically high in fruits, vegetables, whole grains, beans, nuts, and seeds, includes olive oil as an important source of monounsaturated fat, DASH diet, and/or also consider a plant-based diet.  Instructed to limit carbohydrates to 150 grams or less a day as recommended by the American Diabetes Association, verbalized understanding.    -     POCT  A1C    2. Vitamin D deficiency  This is a stable condition. Instructed to start Vitamin D2  50,000 IU every 7 days. Discussed with patient possible side effects of supplement, verbalized understanding. Will assess Vitamin D level in 12 weeks, will recommend ongoing supplementation at that time.    -     vitamin D, Ergocalciferol, (DRISDOL) 1.25 MG (39238 UT) Cap capsule; Take 1 capsule by mouth every 7 days.    3. Anxiety  This is a chronic stable condition. Reviewed TALON and PHQ9 results with patient, verbalized understanding. Instructed to continue current medication regimen and care with established psychiatry and counselor.     4. Elevated liver function tests  This is a stable condition. Reviewed recent labs with patient, verbalized understanding. Instructed to refrain from alcohol and regular tylenol use at this time. Discussed the importance of also continuing to improve diet and maintaining regular exercise, verbalized understanding. Will reassess liver function in 1 month.    Return in about 3 months (around 7/26/2021) for or sooner pending lab results.    Please note that this dictation was created using voice recognition software. I have made every reasonable  attempt to correct obvious errors, but I expect that there are errors of grammar and possibly content that I did not discover before finalizing the note.

## 2021-04-26 NOTE — NON-PROVIDER
Pt here to discuss labs  Pt states her period started early  Good # [phone]  Pharmacy verified.

## 2021-04-26 NOTE — PROGRESS NOTES
30 y.o.  female previously seen for : Chief Complaint:  Desires Fertility , in face of irreegualr cycles     Specialty Problems     None      . Patient now here in follow up.     Patient's last menstrual period was 2021.   2021    Interval 24 days  !!  Subjective: Abdominal Pain: negative    Vaginal Bleedingnegative  Menstrual Cycle: normal: positive  Dysmenorrhea:positive:   Dyspareunia:negative  Urinary Symptoms: negative   Vaginal Discharge:{No          Current Outpatient Medications:   •  ibuprofen (MOTRIN) 800 MG Tab, Take 1 tablet by mouth every 8 hours as needed for Moderate Pain., Disp: 30 tablet, Rfl: 3  •  Prenatal Vit-Fe Fumarate-FA (PRENATAL PO), Take  by mouth., Disp: , Rfl:   •  Coenzyme Q10 (COQ-10) 200 MG Cap, Take 1 Capsule by mouth every bedtime., Disp: , Rfl:   •  MAGNESIUM GLYCINATE PLUS PO, Take 400 mg by mouth every bedtime., Disp: , Rfl:   •  sertraline (ZOLOFT) 50 MG Tab, Take 50 mg by mouth every morning., Disp: , Rfl:   •  rizatriptan (MAXALT) 10 MG tablet, Take 1/2-1 tablet po at onset of headache, may repeat dose in 2 hours if unrelieved.  Do not exceed more than 2 tablets in 24 hours. (Patient not taking: Reported on 3/23/2021), Disp: 9 Tab, Rfl: 3  ROS: no change in ROS since visit of : Visit date not found.  :  Recent Results (from the past 336 hour(s))   TSH WITH REFLEX TO FT4    Collection Time: 21  7:49 AM   Result Value Ref Range    TSH 1.280 0.380 - 5.330 uIU/mL   VITAMIN D,25 HYDROXY    Collection Time: 21  7:49 AM   Result Value Ref Range    25-Hydroxy   Vitamin D 25 14 (L) 30 - 80 ng/mL   Lipid Profile    Collection Time: 21  7:49 AM   Result Value Ref Range    Cholesterol,Tot 137 100 - 199 mg/dL    Triglycerides 60 0 - 149 mg/dL    HDL 55 >=40 mg/dL    LDL 70 <100 mg/dL   Comp Metabolic Panel    Collection Time: 21  7:49 AM   Result Value Ref Range    Sodium 139 135 - 145 mmol/L    Potassium 4.4 3.6 - 5.5 mmol/L    Chloride 108 96 -  112 mmol/L    Co2 24 20 - 33 mmol/L    Anion Gap 7.0 7.0 - 16.0    Glucose 100 (H) 65 - 99 mg/dL    Bun 10 8 - 22 mg/dL    Creatinine 0.52 0.50 - 1.40 mg/dL    Calcium 8.8 8.5 - 10.5 mg/dL    AST(SGOT) 146 (H) 12 - 45 U/L    ALT(SGPT) 69 (H) 2 - 50 U/L    Alkaline Phosphatase 74 30 - 99 U/L    Total Bilirubin 0.5 0.1 - 1.5 mg/dL    Albumin 4.4 3.2 - 4.9 g/dL    Total Protein 7.2 6.0 - 8.2 g/dL    Globulin 2.8 1.9 - 3.5 g/dL    A-G Ratio 1.6 g/dL   CBC WITH DIFFERENTIAL    Collection Time: 04/21/21  7:49 AM   Result Value Ref Range    WBC 5.4 4.8 - 10.8 K/uL    RBC 4.60 4.20 - 5.40 M/uL    Hemoglobin 14.4 12.0 - 16.0 g/dL    Hematocrit 43.3 37.0 - 47.0 %    MCV 94.1 81.4 - 97.8 fL    MCH 31.3 27.0 - 33.0 pg    MCHC 33.3 (L) 33.6 - 35.0 g/dL    RDW 41.3 35.9 - 50.0 fL    Platelet Count 279 164 - 446 K/uL    MPV 10.2 9.0 - 12.9 fL    Neutrophils-Polys 63.90 44.00 - 72.00 %    Lymphocytes 24.50 22.00 - 41.00 %    Monocytes 8.70 0.00 - 13.40 %    Eosinophils 1.90 0.00 - 6.90 %    Basophils 0.60 0.00 - 1.80 %    Immature Granulocytes 0.40 0.00 - 0.90 %    Nucleated RBC 0.00 /100 WBC    Neutrophils (Absolute) 3.44 2.00 - 7.15 K/uL    Lymphs (Absolute) 1.32 1.00 - 4.80 K/uL    Monos (Absolute) 0.47 0.00 - 0.85 K/uL    Eos (Absolute) 0.10 0.00 - 0.51 K/uL    Baso (Absolute) 0.03 0.00 - 0.12 K/uL    Immature Granulocytes (abs) 0.02 0.00 - 0.11 K/uL    NRBC (Absolute) 0.00 K/uL   ESTIMATED GFR    Collection Time: 04/21/21  7:49 AM   Result Value Ref Range    GFR If African American >60 >60 mL/min/1.73 m 2    GFR If Non African American >60 >60 mL/min/1.73 m 2   PROGESTERONE    Collection Time: 04/21/21  7:50 AM   Result Value Ref Range    Progesterone 7.14 ng/mL       Vitals:    04/26/21 0856   BP: 109/72   Weight: 73.5 kg (162 lb)     Past Medical History:   Diagnosis Date   • Allergy     seasonal allergies   • Head ache    • Migraine      PGYN: polymenorrhea  Social History     Socioeconomic History   • Marital status:       Spouse name: Not on file   • Number of children: Not on file   • Years of education: Not on file   • Highest education level: Not on file   Occupational History   • Not on file   Tobacco Use   • Smoking status: Never Smoker   • Smokeless tobacco: Never Used   Substance and Sexual Activity   • Alcohol use: Yes     Alcohol/week: 0.6 oz     Types: 1 Glasses of wine per week     Comment: socially   • Drug use: No   • Sexual activity: Yes     Partners: Male     Birth control/protection: Rhythm   Other Topics Concern   • Not on file   Social History Narrative   • Not on file     Social Determinants of Health     Financial Resource Strain:    • Difficulty of Paying Living Expenses:    Food Insecurity:    • Worried About Running Out of Food in the Last Year:    • Ran Out of Food in the Last Year:    Transportation Needs:    • Lack of Transportation (Medical):    • Lack of Transportation (Non-Medical):    Physical Activity:    • Days of Exercise per Week:    • Minutes of Exercise per Session:    Stress:    • Feeling of Stress :    Social Connections:    • Frequency of Communication with Friends and Family:    • Frequency of Social Gatherings with Friends and Family:    • Attends Hinduism Services:    • Active Member of Clubs or Organizations:    • Attends Club or Organization Meetings:    • Marital Status:    Intimate Partner Violence:    • Fear of Current or Ex-Partner:    • Emotionally Abused:    • Physically Abused:    • Sexually Abused:      Family History   Problem Relation Age of Onset   • Hypertension Father    • Diabetes Paternal Grandmother    • Diabetes Maternal Grandmother    • Breast Cancer Maternal Grandmother         70's   • No Known Problems Mother    • No Known Problems Sister    • No Known Problems Brother    • No Known Problems Sister    • No Known Problems Brother    • No Known Problems Brother    • No Known Problems Brother    • Heart Disease Neg Hx    • Stroke Neg Hx      Past Surgical  History:   Procedure Laterality Date   • OTHER  2012    removal of wisdom teeth         Exam: deferred today     Labs :   Results for BOB POWELL (MRN [id number]) as of 4/26/2021 09:26   Ref. Range 4/21/2021 07:50   Progesterone Latest Units: ng/mL 7.14       Ass:desires fertility          Infrequent sex         Luteal phase dysfunction with polymenorrhea        Spent 30 minutes minutes with the patient ; Face to Face, with >100% of this time spent in counseling and coordination of care, surrounding the above mentioned issues as well as:  Explain , luteal phase function and role of progesterone , in assisting with pregnancy   P. Days 21/25 progesterone and  Day 25 PRL/ TSH , etc     Follow up : Return visit in 4 week(s)

## 2021-04-27 PROBLEM — E55.9 VITAMIN D DEFICIENCY: Status: ACTIVE | Noted: 2021-04-27

## 2021-04-27 RX ORDER — ERGOCALCIFEROL 1.25 MG/1
50000 CAPSULE ORAL
Qty: 12 CAPSULE | Refills: 0 | Status: SHIPPED | OUTPATIENT
Start: 2021-04-27

## 2021-04-27 NOTE — ASSESSMENT & PLAN NOTE
Reports that she continues to seek counseling regularly.  States that she has started Zoloft 50 mg about a month ago.  Denies any side effects to medication at this time.  States that she has noticed a improvement in her overall mood.  States that she is currently working on becoming more physically active.  States that she is exercising and attempting to meal plan.  Reports that she continues to experience ongoing fatigue.    Depression Screening  Little interest or pleasure in doing things?  0 - not at all  Feeling down, depressed , or hopeless? 0 - not at all  Patient Health Questionnaire Score: 0    If depressive symptoms identified deferred to follow up visit unless specifically addressed in assesment and plan.    Interpretation of PHQ-9 Total Score   Score Severity   1-4 Minimal Depression   5-9 Mild Depression   10-14 Moderate Depression   15-19 Moderately Severe Depression   20-27 Severe Depression    TALON-7 Questionnaire  Feeling nervous, anxious, or on edge: Several days  Not being able to sop or control worrying: Several days  Worrying too much about different things: Several days  Trouble relaxing: Not at all  Being so restless that it's hard to sit still: Not at all  Becoming easily annoyed or irritable: Several days  Feeling afraid as if something awful might happen: Not at all  Total: 4    Interpretation of TALON 7 Total Score   Score Severity :  0-4 No Anxiety   5-9 Mild Anxiety  10-14 Moderate Anxiety  15-21 Severe Anxiety    3/31/2021 previous PHQ: 7

## 2021-05-14 ENCOUNTER — HOSPITAL ENCOUNTER (OUTPATIENT)
Dept: LAB | Facility: MEDICAL CENTER | Age: 31
End: 2021-05-14
Attending: OBSTETRICS & GYNECOLOGY
Payer: COMMERCIAL

## 2021-05-14 DIAGNOSIS — Z91.89 AT RISK FOR FERTILITY PROBLEMS: ICD-10-CM

## 2021-05-14 PROCEDURE — 82627 DEHYDROEPIANDROSTERONE: CPT

## 2021-05-14 PROCEDURE — 84144 ASSAY OF PROGESTERONE: CPT

## 2021-05-14 PROCEDURE — 84146 ASSAY OF PROLACTIN: CPT

## 2021-05-14 PROCEDURE — 84443 ASSAY THYROID STIM HORMONE: CPT

## 2021-05-14 PROCEDURE — 84439 ASSAY OF FREE THYROXINE: CPT

## 2021-05-14 PROCEDURE — 84403 ASSAY OF TOTAL TESTOSTERONE: CPT

## 2021-05-14 PROCEDURE — 36415 COLL VENOUS BLD VENIPUNCTURE: CPT

## 2021-05-14 PROCEDURE — 83001 ASSAY OF GONADOTROPIN (FSH): CPT

## 2021-05-14 PROCEDURE — 83002 ASSAY OF GONADOTROPIN (LH): CPT

## 2021-05-15 LAB
DHEA-S SERPL-MCNC: 318 UG/DL (ref 98.8–340)
FSH SERPL-ACNC: 3.3 MIU/ML
LH SERPL-ACNC: 10.1 IU/L
PROGEST SERPL-MCNC: 8.93 NG/ML
PROLACTIN SERPL-MCNC: 12.1 NG/ML (ref 2.8–26)
T4 FREE SERPL-MCNC: 1.12 NG/DL (ref 0.93–1.7)
TSH SERPL DL<=0.005 MIU/L-ACNC: 1.64 UIU/ML (ref 0.38–5.33)

## 2021-05-18 ENCOUNTER — HOSPITAL ENCOUNTER (OUTPATIENT)
Dept: LAB | Facility: MEDICAL CENTER | Age: 31
End: 2021-05-18
Attending: OBSTETRICS & GYNECOLOGY
Payer: COMMERCIAL

## 2021-05-18 DIAGNOSIS — Z91.89 AT RISK FOR FERTILITY PROBLEMS: ICD-10-CM

## 2021-05-18 LAB — PROGEST SERPL-MCNC: 6.45 NG/ML

## 2021-05-18 PROCEDURE — 36415 COLL VENOUS BLD VENIPUNCTURE: CPT

## 2021-05-18 PROCEDURE — 84144 ASSAY OF PROGESTERONE: CPT

## 2021-05-20 ENCOUNTER — GYNECOLOGY VISIT (OUTPATIENT)
Dept: OBGYN | Facility: CLINIC | Age: 31
End: 2021-05-20
Payer: COMMERCIAL

## 2021-05-20 VITALS — BODY MASS INDEX: 29.63 KG/M2 | WEIGHT: 162 LBS | DIASTOLIC BLOOD PRESSURE: 70 MMHG | SYSTOLIC BLOOD PRESSURE: 113 MMHG

## 2021-05-20 DIAGNOSIS — Z91.89 AT RISK FOR FERTILITY PROBLEMS: ICD-10-CM

## 2021-05-20 PROCEDURE — 99213 OFFICE O/P EST LOW 20 MIN: CPT | Performed by: OBSTETRICS & GYNECOLOGY

## 2021-05-20 ASSESSMENT — FIBROSIS 4 INDEX: FIB4 SCORE: 1.83

## 2021-05-20 NOTE — NON-PROVIDER
Pt here to follow up on lab results   Pt states no concerns   Good # [phone]   Pharmacy verified.

## 2021-05-20 NOTE — PROGRESS NOTES
30 y.o.  female previously seen for : Chief Complaint:  Polymenorrhea     Specialty Problems     None      . Patient now here in follow up. Patient with hx of irregular cycles and found to have polymenorrhea and work in process to assess.   Patient does not wish pregnancy at this time .     Patient's last menstrual period was 2021.      Subjective: Abdominal Pain: negative    Vaginal Bleedingnegative  Menstrual Cycle: normal: negative  Dysmenorrhea:negative:   Dyspareunia:negative  Urinary Symptoms: negative   Vaginal Discharge:{No          Current Outpatient Medications:   •  ibuprofen (MOTRIN) 800 MG Tab, Take 1 tablet by mouth every 8 hours as needed for Moderate Pain., Disp: 30 tablet, Rfl: 3  •  sertraline (ZOLOFT) 50 MG Tab, Take 100 mg by mouth every morning., Disp: , Rfl:   •  Coenzyme Q10 (COQ-10) 200 MG Cap, Take 1 Capsule by mouth every bedtime., Disp: , Rfl:   •  MAGNESIUM GLYCINATE PLUS PO, Take 400 mg by mouth every bedtime., Disp: , Rfl:   •  vitamin D, Ergocalciferol, (DRISDOL) 1.25 MG (76367 UT) Cap capsule, Take 1 capsule by mouth every 7 days., Disp: 12 capsule, Rfl: 0  •  Prenatal Vit-Fe Fumarate-FA (PRENATAL PO), Take  by mouth., Disp: , Rfl:   ROS: no change in ROS since visit of : Visit date not found.  :  Recent Results (from the past 336 hour(s))   PROGESTERONE    Collection Time: 21  3:27 PM   Result Value Ref Range    Progesterone 8.93 ng/mL   TSH    Collection Time: 21  3:27 PM   Result Value Ref Range    TSH 1.640 0.380 - 5.330 uIU/mL   PROLACTIN    Collection Time: 21  3:27 PM   Result Value Ref Range    Prolactin 12.10 2.80 - 26.00 ng/mL   FSH    Collection Time: 21  3:27 PM   Result Value Ref Range    Follicle Stimulating Hormone 3.3 mIU/mL   LUTEINIZING HORMONE SERUM    Collection Time: 21  3:27 PM   Result Value Ref Range    Luteinizing Hormone 10.1 IU/L   FREE THYROXINE    Collection Time: 21  3:27 PM   Result Value Ref Range     Free T-4 1.12 0.93 - 1.70 ng/dL   DHEA SULFATE    Collection Time: 05/14/21  3:27 PM   Result Value Ref Range    Dhea-S 318.0 98.8 - 340.0 ug/dL   PROGESTERONE    Collection Time: 05/18/21  4:44 PM   Result Value Ref Range    Progesterone 6.45 ng/mL       Vitals:    05/20/21 0908   BP: 113/70   Weight: 73.5 kg (162 lb)     Past Medical History:   Diagnosis Date   • Allergy     seasonal allergies   • Head ache    • Migraine      PGYN: None  Social History     Socioeconomic History   • Marital status:      Spouse name: Not on file   • Number of children: Not on file   • Years of education: Not on file   • Highest education level: Not on file   Occupational History   • Not on file   Tobacco Use   • Smoking status: Never Smoker   • Smokeless tobacco: Never Used   Vaping Use   • Vaping Use: Never used   Substance and Sexual Activity   • Alcohol use: Yes     Alcohol/week: 0.6 oz     Types: 1 Glasses of wine per week     Comment: socially   • Drug use: No   • Sexual activity: Yes     Partners: Male     Birth control/protection: Rhythm   Other Topics Concern   • Not on file   Social History Narrative   • Not on file     Social Determinants of Health     Financial Resource Strain:    • Difficulty of Paying Living Expenses:    Food Insecurity:    • Worried About Running Out of Food in the Last Year:    • Ran Out of Food in the Last Year:    Transportation Needs:    • Lack of Transportation (Medical):    • Lack of Transportation (Non-Medical):    Physical Activity:    • Days of Exercise per Week:    • Minutes of Exercise per Session:    Stress:    • Feeling of Stress :    Social Connections:    • Frequency of Communication with Friends and Family:    • Frequency of Social Gatherings with Friends and Family:    • Attends Hindu Services:    • Active Member of Clubs or Organizations:    • Attends Club or Organization Meetings:    • Marital Status:    Intimate Partner Violence:    • Fear of Current or Ex-Partner:    •  Emotionally Abused:    • Physically Abused:    • Sexually Abused:      Family History   Problem Relation Age of Onset   • Hypertension Father    • Diabetes Paternal Grandmother    • Diabetes Maternal Grandmother    • Breast Cancer Maternal Grandmother         70's   • No Known Problems Mother    • No Known Problems Sister    • No Known Problems Brother    • No Known Problems Sister    • No Known Problems Brother    • No Known Problems Brother    • No Known Problems Brother    • Heart Disease Neg Hx    • Stroke Neg Hx      Past Surgical History:   Procedure Laterality Date   • OTHER 2012    removal of wisdom teeth         Exam: deferred     Ass: luteal phase dysfunction            Spent 25  minutes with the patient ; Face to Face, with >100% of this time spent in counseling and coordination of care, surrounding the above mentioned issues as well as:  Discuss potential problems with SAB, if she conceives and she needs condoms , for now .     P. Condoms        Prenatal vitamins   SO/patient to determine when pregnancy desired     Follow up : Return visit in 1 year(s)

## 2021-05-22 LAB — TESTOST SERPL-MCNC: 35 NG/DL (ref 9–55)

## 2021-05-24 DIAGNOSIS — R73.01 ELEVATED FASTING GLUCOSE: ICD-10-CM

## 2021-05-24 DIAGNOSIS — R74.8 ELEVATED LIVER ENZYMES: ICD-10-CM

## 2021-05-28 ENCOUNTER — HOSPITAL ENCOUNTER (OUTPATIENT)
Dept: LAB | Facility: MEDICAL CENTER | Age: 31
End: 2021-05-28
Attending: NURSE PRACTITIONER
Payer: COMMERCIAL

## 2021-05-28 DIAGNOSIS — R74.8 ELEVATED LIVER ENZYMES: ICD-10-CM

## 2021-05-28 DIAGNOSIS — R73.01 ELEVATED FASTING GLUCOSE: ICD-10-CM

## 2021-05-28 LAB
ALBUMIN SERPL BCP-MCNC: 4.3 G/DL (ref 3.2–4.9)
ALBUMIN/GLOB SERPL: 1.5 G/DL
ALP SERPL-CCNC: 80 U/L (ref 30–99)
ALT SERPL-CCNC: 10 U/L (ref 2–50)
ANION GAP SERPL CALC-SCNC: 11 MMOL/L (ref 7–16)
AST SERPL-CCNC: 12 U/L (ref 12–45)
BILIRUB SERPL-MCNC: 0.5 MG/DL (ref 0.1–1.5)
BUN SERPL-MCNC: 11 MG/DL (ref 8–22)
CALCIUM SERPL-MCNC: 8.5 MG/DL (ref 8.5–10.5)
CHLORIDE SERPL-SCNC: 107 MMOL/L (ref 96–112)
CO2 SERPL-SCNC: 21 MMOL/L (ref 20–33)
CREAT SERPL-MCNC: 0.55 MG/DL (ref 0.5–1.4)
EST. AVERAGE GLUCOSE BLD GHB EST-MCNC: 91 MG/DL
GLOBULIN SER CALC-MCNC: 2.9 G/DL (ref 1.9–3.5)
GLUCOSE SERPL-MCNC: 91 MG/DL (ref 65–99)
HBA1C MFR BLD: 4.8 % (ref 4–5.6)
POTASSIUM SERPL-SCNC: 4 MMOL/L (ref 3.6–5.5)
PROT SERPL-MCNC: 7.2 G/DL (ref 6–8.2)
SODIUM SERPL-SCNC: 139 MMOL/L (ref 135–145)

## 2021-05-28 PROCEDURE — 83036 HEMOGLOBIN GLYCOSYLATED A1C: CPT

## 2021-05-28 PROCEDURE — 80053 COMPREHEN METABOLIC PANEL: CPT

## 2021-05-28 PROCEDURE — 36415 COLL VENOUS BLD VENIPUNCTURE: CPT

## 2021-06-16 ENCOUNTER — OFFICE VISIT (OUTPATIENT)
Dept: MEDICAL GROUP | Facility: IMAGING CENTER | Age: 31
End: 2021-06-16
Payer: COMMERCIAL

## 2021-06-16 VITALS
HEART RATE: 67 BPM | OXYGEN SATURATION: 97 % | RESPIRATION RATE: 12 BRPM | SYSTOLIC BLOOD PRESSURE: 102 MMHG | BODY MASS INDEX: 29.74 KG/M2 | WEIGHT: 161.6 LBS | HEIGHT: 62 IN | TEMPERATURE: 98.4 F | DIASTOLIC BLOOD PRESSURE: 64 MMHG

## 2021-06-16 DIAGNOSIS — N92.6 MENSTRUAL CHANGES: ICD-10-CM

## 2021-06-16 DIAGNOSIS — R53.83 FATIGUE, UNSPECIFIED TYPE: ICD-10-CM

## 2021-06-16 LAB
INT CON NEG: NORMAL
INT CON POS: NORMAL
POC URINE PREGNANCY TEST: NEGATIVE

## 2021-06-16 PROCEDURE — 99213 OFFICE O/P EST LOW 20 MIN: CPT | Performed by: NURSE PRACTITIONER

## 2021-06-16 PROCEDURE — 81025 URINE PREGNANCY TEST: CPT | Performed by: NURSE PRACTITIONER

## 2021-06-16 RX ORDER — SERTRALINE HYDROCHLORIDE 100 MG/1
TABLET, FILM COATED ORAL
COMMUNITY

## 2021-06-16 ASSESSMENT — PAIN SCALES - GENERAL: PAINLEVEL: NO PAIN

## 2021-06-16 ASSESSMENT — FIBROSIS 4 INDEX: FIB4 SCORE: 0.39

## 2021-06-17 NOTE — PATIENT INSTRUCTIONS
Overcoming Estrogen Dominance- Alie Santiago   Root Cause by Charisma Kern, PHarmD, FASCP. There is a lot of great information about antiinflammatory diet and overall thyroid health.

## 2021-06-19 NOTE — PROGRESS NOTES
Subjective:   CC: Menstrual Problem (follow-up from gyn ) and Referral Needed (hormonal balance )    HPI:   Cata presents today to discuss:    Reports that she recently has seen OB/GYN.  States that they reviewed her hormone labs.  States that she was told that she is at risk for infertility.  States that she is also was told due to progesterone levels that she is at risk for spontaneous  if she becomes pregnant naturally.  States that OB/GYN recommended that she is on a birth control until she wants to become pregnant and at that time they will assist her with pregnancy.  States that she is wondering if she needs a referral to endocrinology for further evaluation of hormone imbalance.  States that she continues to exercise regularly.  States that she continues to have intermittent ongoing fatigue.  States that she has not been sleeping well recently.  States that she is late 2 days from her menstrual cycle starting.  States that she is experiencing cramping, but has not started her menstrual cycle.  States that she is slightly concerned she has become pregnant.  States that she overall would like to become pregnant in the next year her and her  are discussing this at this time.    Past Medical History:   Diagnosis Date   • Allergy     seasonal allergies   • Head ache    • Migraine      Social History     Tobacco Use   • Smoking status: Never Smoker   • Smokeless tobacco: Never Used   Vaping Use   • Vaping Use: Never used   Substance Use Topics   • Alcohol use: Yes     Alcohol/week: 0.6 oz     Types: 1 Glasses of wine per week     Comment: socially   • Drug use: No     Current Outpatient Medications Ordered in Epic   Medication Sig Dispense Refill   • sertraline (ZOLOFT) 100 MG Tab      • vitamin D, Ergocalciferol, (DRISDOL) 1.25 MG (31498 UT) Cap capsule Take 1 capsule by mouth every 7 days. 12 capsule 0   • ibuprofen (MOTRIN) 800 MG Tab Take 1 tablet by mouth every 8 hours as needed for Moderate  "Pain. 30 tablet 3   • Prenatal Vit-Fe Fumarate-FA (PRENATAL PO) Take  by mouth.     • Coenzyme Q10 (COQ-10) 200 MG Cap Take 1 Capsule by mouth every bedtime.     • MAGNESIUM GLYCINATE PLUS PO Take 400 mg by mouth every bedtime.       No current Epic-ordered facility-administered medications on file.     Allergies:  Patient has no known allergies.    ROS:  Constitutional: Denies fever, chills, night sweats, weight loss/gain or malaise.  Fatigue, see HPI.  HENT: Denies nasal congestion, sore throat, hearing loss, enlarged thyroid, or difficulty swallowing.    Eyes: Denies changes in vision, pain. Denies wearing corrective wear.   Respiratory: Denies cough, SOB at rest or activity.    Cardiovascular: Denies tachycardia, chest pain, palpitations, or  leg swelling.   Gastrointestinal: Denies N/V/C/D, abdominal pain, loss appetite, reflux, or hematochezia.  Genitourinary: Denies difficulty voiding, dysuria, nocturia, or hematuria.   History of kidney stones.  Skin: Negative for rash or worrisome moles.   Neurological: Negative for focal weakness. History of migraines with dizziness.  Endo/Heme/Allergies: Denies bruise/bleed easily. Seasonal allergies.   Psychiatric/Behavioral: Denies depression. History of anxiety.  Difficulty sleeping, see HPI.    Objective:   Exam:  /64 (BP Location: Left arm, Patient Position: Sitting, BP Cuff Size: Adult)   Pulse 67   Temp 36.9 °C (98.4 °F) (Temporal)   Resp 12   Ht 1.575 m (5' 2\")   Wt 73.3 kg (161 lb 9.6 oz)   LMP 05/21/2021   SpO2 97%   BMI 29.56 kg/m²  Body mass index is 29.56 kg/m².  General: Normal appearing. No distress.  HEENT: Normocephalic. Eyes conjunctiva clear lids without ptosis, PERRLA, ears normal shape and contour. Oral, ears, and nasal examine deferred due to current COVID-19 outbreak, no acute concerns. Patient wore a mask during visit.  Neck: Trachea midline, no masses, no thyromegaly.  Pulmonary:  Unlabored respiratory effort, no cough.  Neurologic: " Grossly non-focal.  Skin: Warm and dry. No obvious lesions.  Musculoskeletal: Normal gait. No extremity cyanosis, clubbing, or edema.   Psych: Normal mood and affect. Alert and oriented x3. Judgment and insight is normal.    Labs: POCT pregnancy test: Negative    Assessment & Plan:   1. Menstrual changes  This is a chronic stable condition.  Reviewed POC pregnancy test results with patient, verbalized understanding.  Reviewed recent labs completed by OB/GYN with patient, verbalized understanding.  Reviewed plan of care from OB/GYN.  Discussed with patient that it is PCPs understanding that OB/GYN was concerned if she becomes pregnant due to current progesterone level she is at high risk for spontaneous .  Discussed with patient if she does become pregnant to notify OB/GYN immediately to evaluate if she will need supplement of progesterone to promote a safe and healthy pregnancy, verbalized understanding and willingness.  Discussed with patient possible options of referral to endocrinology and/or fertility specialist, verbalized understanding and declined at this time.  Discussed with patient importance of continuing to eat a well-balanced diet, exercise regularly, participating in mindfulness exercises to decrease overall stress, and to receive appropriate sleep for overall benefit to mental health and general health. Recommended Root Cause by Charisma Kern, PHarmD, FASCP and Overcoming Estrogen Dominance by Alie Santiago for overall benefit of exploring an antiinflammatory diet and hormone balancing diet, verbalized understanding and willingness to explore both.  Instructed to repeat pregnancy test in 5 days that she continues to not only menstrual cycle, verbalized understanding willingness.    - POCT Pregnancy    2. Fatigue, unspecified type  This is a chronic stable condition.  Instructed to continue taking vitamin D 2 supplements as previously prescribed.  Discussed with patient possibly using  calm supplement to help with overall sleeping pattern, verbalized understanding and willingness.  Discussed the overall benefit of a well-balanced diet, regular exercise, and stress management, verbalized understanding.    Return for As needed.    Please note that this dictation was created using voice recognition software. I have made every reasonable attempt to correct obvious errors, but I expect that there are errors of grammar and possibly content that I did not discover before finalizing the note.

## 2021-07-12 DIAGNOSIS — E55.9 VITAMIN D DEFICIENCY: ICD-10-CM

## 2021-07-30 ENCOUNTER — HOSPITAL ENCOUNTER (OUTPATIENT)
Dept: LAB | Facility: MEDICAL CENTER | Age: 31
End: 2021-07-30
Attending: NURSE PRACTITIONER
Payer: COMMERCIAL

## 2021-07-30 DIAGNOSIS — E55.9 VITAMIN D DEFICIENCY: ICD-10-CM

## 2021-07-30 LAB — 25(OH)D3 SERPL-MCNC: 35 NG/ML (ref 30–100)

## 2021-07-30 PROCEDURE — 82306 VITAMIN D 25 HYDROXY: CPT

## 2021-07-30 PROCEDURE — 36415 COLL VENOUS BLD VENIPUNCTURE: CPT

## 2021-08-04 DIAGNOSIS — G47.34 NOCTURNAL OXYGEN DESATURATION: ICD-10-CM

## 2024-05-28 NOTE — PATIENT INSTRUCTIONS
Continue with acetaminophen alternating with ibuprofen per 's instructions. Work note provided.    A referral request has been placed for neurology. We have a referrals department that is tasked with locating a suitable office that currently accepts patients and your insurance and you should be receiving referral information from them such as the office name, address, and number. This process usually takes around 3 business days. If you are not contacted by our referrals department, please call [phone].   (E4) spontaneous